# Patient Record
Sex: MALE | Race: WHITE | HISPANIC OR LATINO | Employment: FULL TIME
[De-identification: names, ages, dates, MRNs, and addresses within clinical notes are randomized per-mention and may not be internally consistent; named-entity substitution may affect disease eponyms.]

---

## 2017-01-09 ENCOUNTER — RECORDS - HEALTHEAST (OUTPATIENT)
Dept: ADMINISTRATIVE | Facility: OTHER | Age: 27
End: 2017-01-09

## 2017-03-14 ENCOUNTER — RECORDS - HEALTHEAST (OUTPATIENT)
Dept: ADMINISTRATIVE | Facility: OTHER | Age: 27
End: 2017-03-14

## 2017-03-17 ENCOUNTER — RECORDS - HEALTHEAST (OUTPATIENT)
Dept: ADMINISTRATIVE | Facility: OTHER | Age: 27
End: 2017-03-17

## 2017-03-28 ENCOUNTER — RECORDS - HEALTHEAST (OUTPATIENT)
Dept: ADMINISTRATIVE | Facility: OTHER | Age: 27
End: 2017-03-28

## 2017-05-16 ENCOUNTER — RECORDS - HEALTHEAST (OUTPATIENT)
Dept: ADMINISTRATIVE | Facility: OTHER | Age: 27
End: 2017-05-16

## 2017-07-27 ENCOUNTER — RECORDS - HEALTHEAST (OUTPATIENT)
Dept: ADMINISTRATIVE | Facility: OTHER | Age: 27
End: 2017-07-27

## 2017-08-03 ENCOUNTER — RECORDS - HEALTHEAST (OUTPATIENT)
Dept: ADMINISTRATIVE | Facility: OTHER | Age: 27
End: 2017-08-03

## 2017-12-18 ENCOUNTER — OFFICE VISIT - HEALTHEAST (OUTPATIENT)
Dept: FAMILY MEDICINE | Facility: CLINIC | Age: 27
End: 2017-12-18

## 2017-12-18 DIAGNOSIS — I10 ESSENTIAL HYPERTENSION: ICD-10-CM

## 2017-12-18 DIAGNOSIS — F90.9 ADHD: ICD-10-CM

## 2017-12-18 DIAGNOSIS — F31.81 BIPOLAR 2 DISORDER (H): ICD-10-CM

## 2017-12-18 DIAGNOSIS — M54.2 CERVICALGIA: ICD-10-CM

## 2017-12-18 ASSESSMENT — MIFFLIN-ST. JEOR: SCORE: 1774.11

## 2018-01-09 ENCOUNTER — RECORDS - HEALTHEAST (OUTPATIENT)
Dept: ADMINISTRATIVE | Facility: OTHER | Age: 28
End: 2018-01-09

## 2018-02-21 ENCOUNTER — RECORDS - HEALTHEAST (OUTPATIENT)
Dept: ADMINISTRATIVE | Facility: OTHER | Age: 28
End: 2018-02-21

## 2018-04-04 ENCOUNTER — RECORDS - HEALTHEAST (OUTPATIENT)
Dept: ADMINISTRATIVE | Facility: OTHER | Age: 28
End: 2018-04-04

## 2018-04-25 ENCOUNTER — RECORDS - HEALTHEAST (OUTPATIENT)
Dept: ADMINISTRATIVE | Facility: OTHER | Age: 28
End: 2018-04-25

## 2018-05-02 ENCOUNTER — RECORDS - HEALTHEAST (OUTPATIENT)
Dept: ADMINISTRATIVE | Facility: OTHER | Age: 28
End: 2018-05-02

## 2018-05-16 ENCOUNTER — RECORDS - HEALTHEAST (OUTPATIENT)
Dept: ADMINISTRATIVE | Facility: OTHER | Age: 28
End: 2018-05-16

## 2018-05-21 ENCOUNTER — RECORDS - HEALTHEAST (OUTPATIENT)
Dept: ADMINISTRATIVE | Facility: OTHER | Age: 28
End: 2018-05-21

## 2018-06-20 ENCOUNTER — RECORDS - HEALTHEAST (OUTPATIENT)
Dept: ADMINISTRATIVE | Facility: OTHER | Age: 28
End: 2018-06-20

## 2018-07-23 ENCOUNTER — RECORDS - HEALTHEAST (OUTPATIENT)
Dept: ADMINISTRATIVE | Facility: OTHER | Age: 28
End: 2018-07-23

## 2018-07-31 ENCOUNTER — RECORDS - HEALTHEAST (OUTPATIENT)
Dept: ADMINISTRATIVE | Facility: OTHER | Age: 28
End: 2018-07-31

## 2021-05-25 ENCOUNTER — OFFICE VISIT - HEALTHEAST (OUTPATIENT)
Dept: FAMILY MEDICINE | Facility: CLINIC | Age: 31
End: 2021-05-25

## 2021-05-25 DIAGNOSIS — Z76.89 ENCOUNTER TO ESTABLISH CARE: ICD-10-CM

## 2021-05-25 DIAGNOSIS — F90.9 ATTENTION DEFICIT HYPERACTIVITY DISORDER (ADHD), UNSPECIFIED ADHD TYPE: ICD-10-CM

## 2021-05-25 DIAGNOSIS — J30.81 ALLERGIC RHINITIS DUE TO ANIMALS: ICD-10-CM

## 2021-05-25 DIAGNOSIS — Z00.00 ANNUAL PHYSICAL EXAM: ICD-10-CM

## 2021-05-25 DIAGNOSIS — B07.0 PLANTAR WARTS: ICD-10-CM

## 2021-05-25 DIAGNOSIS — F31.81 BIPOLAR 2 DISORDER (H): ICD-10-CM

## 2021-05-25 LAB
ALBUMIN SERPL-MCNC: 4.3 G/DL (ref 3.5–5)
ALP SERPL-CCNC: 59 U/L (ref 45–120)
ALT SERPL W P-5'-P-CCNC: 14 U/L (ref 0–45)
ANION GAP SERPL CALCULATED.3IONS-SCNC: 8 MMOL/L (ref 5–18)
AST SERPL W P-5'-P-CCNC: 16 U/L (ref 0–40)
BILIRUB SERPL-MCNC: 0.6 MG/DL (ref 0–1)
BUN SERPL-MCNC: 8 MG/DL (ref 8–22)
CALCIUM SERPL-MCNC: 9.1 MG/DL (ref 8.5–10.5)
CHLORIDE BLD-SCNC: 105 MMOL/L (ref 98–107)
CHOLEST SERPL-MCNC: 157 MG/DL
CO2 SERPL-SCNC: 26 MMOL/L (ref 22–31)
CREAT SERPL-MCNC: 0.85 MG/DL (ref 0.7–1.3)
ERYTHROCYTE [DISTWIDTH] IN BLOOD BY AUTOMATED COUNT: 12.1 % (ref 11–14.5)
FASTING STATUS PATIENT QL REPORTED: YES
GFR SERPL CREATININE-BSD FRML MDRD: >60 ML/MIN/1.73M2
GLUCOSE BLD-MCNC: 85 MG/DL (ref 70–125)
HCT VFR BLD AUTO: 45 % (ref 40–54)
HDLC SERPL-MCNC: 37 MG/DL
HGB BLD-MCNC: 15.5 G/DL (ref 14–18)
LDLC SERPL CALC-MCNC: 92 MG/DL
MCH RBC QN AUTO: 29.3 PG (ref 27–34)
MCHC RBC AUTO-ENTMCNC: 34.4 G/DL (ref 32–36)
MCV RBC AUTO: 85 FL (ref 80–100)
PLATELET # BLD AUTO: 201 THOU/UL (ref 140–440)
PMV BLD AUTO: 10.2 FL (ref 7–10)
POTASSIUM BLD-SCNC: 4.5 MMOL/L (ref 3.5–5)
PROT SERPL-MCNC: 6.8 G/DL (ref 6–8)
RBC # BLD AUTO: 5.29 MILL/UL (ref 4.4–6.2)
SODIUM SERPL-SCNC: 139 MMOL/L (ref 136–145)
TRIGL SERPL-MCNC: 142 MG/DL
WBC: 5.3 THOU/UL (ref 4–11)

## 2021-05-25 ASSESSMENT — MIFFLIN-ST. JEOR: SCORE: 1787.26

## 2021-05-25 ASSESSMENT — PATIENT HEALTH QUESTIONNAIRE - PHQ9: SUM OF ALL RESPONSES TO PHQ QUESTIONS 1-9: 16

## 2021-05-26 ENCOUNTER — COMMUNICATION - HEALTHEAST (OUTPATIENT)
Dept: FAMILY MEDICINE | Facility: CLINIC | Age: 31
End: 2021-05-26

## 2021-05-31 VITALS — WEIGHT: 178.1 LBS | BODY MASS INDEX: 25.5 KG/M2 | HEIGHT: 70 IN

## 2021-06-01 ENCOUNTER — AMBULATORY - HEALTHEAST (OUTPATIENT)
Dept: FAMILY MEDICINE | Facility: CLINIC | Age: 31
End: 2021-06-01

## 2021-06-01 DIAGNOSIS — B07.8 COMMON WART: ICD-10-CM

## 2021-06-01 DIAGNOSIS — B07.0 PLANTAR WART: ICD-10-CM

## 2021-06-14 NOTE — PROGRESS NOTES
Assessment/Plan:     1. Cervicalgia  I believe specialty care is appropriate for this patient.  Referral placed for spine care to continue at [facility].  - Ambulatory referral to Spine Care    2. Bipolar 2 disorder  Continue following a psychiatry for ADHD and bipolar disorder.  - Ambulatory referral to Psychiatry    3. Essential hypertension  Blood pressure elevated today.  Likely secondary to prescription amphetamine use.  However, it appears that this is been a problem for some time.  Discussed benefits of treating, and will initiate today.  Start lisinopril, 10 mg once daily.  Discussed proper use and possible side effects of this medication.  Patient instructed to follow-up with me in 3-4 weeks.  We will check kidney function at that visit.  - lisinopril (PRINIVIL,ZESTRIL) 10 MG tablet; Take 1 tablet (10 mg total) by mouth daily.  Dispense: 30 tablet; Refill: 1    Subjective:     Jorge A Garcia is a 27 y.o. male who presents to establish care and for referrals.  Pertinent history of chronic neck and bilateral hand radiculopathy.  Patient follows with [facility] for this and has had 2 epidural injections.  Patient plays the guitar and piano, and believes his symptoms stem from this.  He is also undergoing physical therapy at [facility].  Patient's insurance has changed, and his new insurance is requesting a referral for further treatment at [facility].    Patient also has history of ADHD and bipolar disorder.  He follows with Dr. Woodruff at [facility].  He is currently utilizing Adderall and Lamictal, which has been a good regimen for him.  Patient has been told that his blood pressure has been high in the past.  He does not, however check this frequently.  No chest pain, shortness of breath, or lower extremity edema.  No pertinent family history of hypertension or heart disease.      The following portions of the patient's history were reviewed and updated as appropriate: allergies,  "current medications, past family history, past medical history, past social history, past surgical history and problem list.    Review of Systems  Pertinent items are noted in HPI.     Objective:     /88 (Patient Site: Right Arm)  Pulse 99  Ht 5' 10\" (1.778 m)  Wt 178 lb 1.6 oz (80.8 kg)  SpO2 98%  BMI 25.55 kg/m2    General Appearance: Alert, cooperative, no distress, appears stated age  Lungs: Clear to auscultation bilaterally, respirations unlabored  Heart: Regular rate and rhythm, S1 and S2 normal, no murmur, rub, or gallop. No LE edema.  Psychologic: appropriate affective, answers all of my questions appropriately. No hallucinations, delusion, or suicidal ideations.    Deann Harrison NP-C    "

## 2021-06-16 PROBLEM — F90.9 ADHD: Status: ACTIVE | Noted: 2017-12-18

## 2021-06-17 NOTE — PROGRESS NOTES
"Assessment/ Plan     31-year-old male with past medical history of ADHD, bipolar type II, allergic rhinitis, depression who presents for physical exam and to establish care.  Patient also wishes to discuss some mild concerns today including treatment options for plantar warts on his right foot as well as a referral to an allergist to consider allergy shots.  Plantar warts believe were under treated with over-the-counter cryotherapy.  I recommended a full course of cryotherapy here after paring down plantar warts given significant callus buildup.  Patient will make a follow-up appointment to do this.  Did give referral to allergist to consider injections.  Given that patient is new to me he did review his previous diagnosis and updated in problem list as below.  For physical he is only due for basic blood work as below.  He believes he has been tested for HIV as well as hep C and is up-to-date on his immunizations despite what our chart says.  He has had care at various other places during his life due to schooling.  He will attempt obtain these records so we can consolidate them and get him up-to-date if needed.    1. Attention deficit hyperactivity disorder (ADHD), unspecified ADHD type  Dr. Hernández-psychiatrist of [facility]. Diagnosed in College now on stimulant working well.  In college he states things got \"rough\".  He started having significant anxiety and depression. Fair grades during this. No improvement in grades after stimulant. He noticed improvement in \"anxiety, fidgetiness, restlessness\". He was formally tested and diagnosed with this.     2. Bipolar 2 disorder (H)  Diagnosed in college. 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 He was on lamotrigine but had hives. He is considering starting lithium. His thyroid was checked and \"boderline low\".  Trying to correct this before starting lithium. ON synthroid like replacement.     3. Allergic rhinitis due to animals  - Ambulatory referral to Allergy - " [facility]    4. Annual physical exam  - Comprehensive Metabolic Panel  - Lipid [place] FASTING  - HM2(CBC w/o Differential)    5. Encounter to establish care    6. Plantar warts    Follow-up: in 1-2 weeks to start wart cryotherapy    Mckinley Ellison MD    Subjective:     Jorge A Garcia is a 31 y.o. male who presents for an annual exam.      Chief Complaint   Patient presents with     Annual Exam     fasting     Wart on foot     Allergies     referral to allergist, increased reaction to dogs, and seasonal allergies     Warts:  Patient has two warts on his feet.  Right foot, has been there for several years.  He has tried over-the-counter cryotherapy treatment but this does not seem to have helped.  Bothersome when he walks as he feels a pressure at the location of his warts.  Wondering about treatment options for this.    Allergies:  Patient has significant allergies to animals as well as some pollens.  He has been controlling this with over-the-counter antihistamine as well as Nasonex.  He is considering allergy shots.  Would like more information regarding these.  He has never had skin testing to determine his exact allergies.  Feels his allergies have been consistently worsening over the last couple years.    Immunization History   Administered Date(s) Administered     Influenza, inj, historic,unspecified 01/11/2013     Influenza,seasonal quad, PF 01/10/2014     Meningococcal MCV4P 06/17/2009     Tdap 06/17/2009     Immunization status: up to date, do not have records from previous but he will try to obtain these    Current Outpatient Medications   Medication Sig Dispense Refill     cetirizine (ZYRTEC) 10 MG tablet Take 10 mg by mouth.       dextroamphetamine-amphetamine 20 mg Tab TK 1 T PO BID  0     liothyronine (CYTOMEL) 5 MCG tablet Take 5 mcg by mouth daily.       mometasone (NASONEX) 50 mcg/actuation nasal spray 2 sprays into each nostril.       lamoTRIgine (LAMICTAL) 100 MG tablet   2     lisinopril  (PRINIVIL,ZESTRIL) 10 MG tablet Take 1 tablet (10 mg total) by mouth daily. 30 tablet 1     meloxicam (MOBIC) 15 MG tablet TK 1 T PO QD  1     No current facility-administered medications for this visit.      No past medical history on file.  Past Surgical History:   Procedure Laterality Date     WISDOM TOOTH EXTRACTION       Cephalosporins and Penicillins  No family history on file.  Social History     Socioeconomic History     Marital status: Single     Spouse name: Not on file     Number of children: Not on file     Years of education: Not on file     Highest education level: Not on file   Occupational History     Not on file   Social Needs     Financial resource strain: Not on file     Food insecurity     Worry: Not on file     Inability: Not on file     Transportation needs     Medical: Not on file     Non-medical: Not on file   Tobacco Use     Smoking status: Never Smoker     Smokeless tobacco: Never Used   Substance and Sexual Activity     Alcohol use: No     Drug use: No     Sexual activity: Not on file   Lifestyle     Physical activity     Days per week: Not on file     Minutes per session: Not on file     Stress: Not on file   Relationships     Social connections     Talks on phone: Not on file     Gets together: Not on file     Attends Restorationist service: Not on file     Active member of club or organization: Not on file     Attends meetings of clubs or organizations: Not on file     Relationship status: Not on file     Intimate partner violence     Fear of current or ex partner: Not on file     Emotionally abused: Not on file     Physically abused: Not on file     Forced sexual activity: Not on file   Other Topics Concern     Not on file   Social History Narrative     Not on file       Review of Systems  Complete ROS negative except as noted in the HPI    Objective:      Vitals:    05/25/21 0806   BP: 132/80   Pulse: 70   Resp: 20   Temp: 98  F (36.7  C)   SpO2: 98%   Weight: 181 lb (82.1 kg)   Height: 5'  "10\" (1.778 m)       Physical Exam:  /80   Pulse 70   Temp 98  F (36.7  C)   Resp 20   Ht 5' 10\" (1.778 m)   Wt 181 lb (82.1 kg)   SpO2 98%   BMI 25.97 kg/m      General appearance: Alert, cooperative, no distress, appears stated age  Head: Normocephalic, atraumatic, without obvious abnormality  EARS: TM's gray dull with structures seen bilaterally  Eyes: Pupils equal round, reactive.  Conjunctiva clear.  Nose: Nares normal, no drainage.  Throat: Lips, mucosa, tongue normal mucosa pink and moist  Neck: Supple, symmetric, trachea midline, no adenopathy.  No thyroid enlargement, tenderness or nodules.    Lungs: Clear to auscultation bilaterally, no wheezing or crackles present.  Respirations unlabored  Heart: Regular rate and rhythm, normal S1 and S2, no murmur, rub or gallop.  Abdomen: Soft, nontender, nondistended.  Bowel sounds active in all 4 quadrants.  No masses or organomegaly.  Extremities: Extremities normal, atraumatic.  No cyanosis or edema.  Skin: two plantar warts over heel of right foot, significant callus build up.   Neurologic: CN II through XII intact, normal strength.    No results found for this or any previous visit.    Mckinley Alcazar MD    "

## 2021-06-25 NOTE — PROGRESS NOTES
Assessment and Plan     1. Plantar wart  2. Common wart  Procedure: Cryotherapy  Plantar warts on right foot were paired to base using a 10 scalpel.  Using cryotherapy applicator area was frozen with 1 mm border surrounding and allowed to thaw completely. Two warts over right plantar foot and one wart on left pointer finger.   Freezing was then repeated.  Patient tolerated the procedure well without complications.  Normal post cryotherapy side effects and return precautions given.  Recommended repeat cryotherapy in 2 to 3 weeks if wart persists.      Follow up: 2-3 weeks  Options for treatment and follow-up care were reviewed with the patient and/or guardian. Jorge A Garcia and/or guardian engaged in the decision making process and verbalized understanding of the options discussed and agreed with the final plan.    Dr. Mckinley Ellison MD         HPI:   Jorge A Garcia is a 31 y.o.  male with problems as below, who presents for:    Chief Complaint   Patient presents with     Concerns     wart removal on right foot. questioning about one of finger as well.      31-year-old male who presents for wart removal on his right foot and left hand.         PMHX:     Patient Active Problem List   Diagnosis     Major Depression, Recurrent     Bipolar 2 disorder (H)     Anxiety Disorder NOS     ADHD       Current Outpatient Medications   Medication Sig Dispense Refill     cetirizine (ZYRTEC) 10 MG tablet Take 10 mg by mouth.       dextroamphetamine-amphetamine 20 mg Tab TK 1 T PO BID  0     liothyronine (CYTOMEL) 5 MCG tablet Take 5 mcg by mouth daily.       mometasone (NASONEX) 50 mcg/actuation nasal spray 2 sprays into each nostril.       No current facility-administered medications for this visit.        Social History     Tobacco Use     Smoking status: Never Smoker     Smokeless tobacco: Never Used   Substance Use Topics     Alcohol use: Yes     Frequency: 2-3 times a week     Drinks per session: 1 or 2     Drug use: No        Social History     Social History Narrative     Not on file       Allergies   Allergen Reactions     Cephalosporins      Penicillins               Review of Systems:    Complete ROS is negative except as noted in the HPI         Physical Exam:   /74 (Patient Site: Right Arm, Patient Position: Sitting, Cuff Size: Adult Large)   Pulse 90   Wt 184 lb 8 oz (83.7 kg)   SpO2 96%   BMI 26.47 kg/m      General appearance: Alert, cooperative, no distress, appears stated age  Head: Normocephalic, atraumatic, without obvious abnormality  Eyes: Pupils equal round, reactive.  Conjunctiva clear.  Neck: Supple, symmetric, trachea midline  Lungs: Clear to auscultation bilaterally, no wheezing or crackles present.  Respirations unlabored  Heart: Regular rate and rhythm, normal S1 and S2, no murmur, rub or gallop.  Skin: 2 plantar warts on right foot approximately 0.25 cm in size over heel. Some associated corn buildup.  Approximately 0.25 cm wart over left hand point at dorsal DIP

## 2021-07-04 NOTE — LETTER
Letter by Mckinley Alcazar MD at      Author: Mckinley Alcazar MD Service: -- Author Type: --    Filed:  Encounter Date: 5/26/2021 Status: (Other)         Jorge A Garcia  [address] [address]             May 26, 2021         Dear Mr. Garcia,    Below are the results from your recent visit:    Resulted Orders   Comprehensive Metabolic Panel   Result Value Ref Range    Sodium 139 136 - 145 mmol/L    Potassium 4.5 3.5 - 5.0 mmol/L    Chloride 105 98 - 107 mmol/L    CO2 26 22 - 31 mmol/L    Anion Gap, Calculation 8 5 - 18 mmol/L    Glucose 85 70 - 125 mg/dL    BUN 8 8 - 22 mg/dL    Creatinine 0.85 0.70 - 1.30 mg/dL    GFR MDRD Af Amer >60 >60 mL/min/1.73m2    GFR MDRD Non Af Amer >60 >60 mL/min/1.73m2    Bilirubin, Total 0.6 0.0 - 1.0 mg/dL    Calcium 9.1 8.5 - 10.5 mg/dL    Protein, Total 6.8 6.0 - 8.0 g/dL    Albumin 4.3 3.5 - 5.0 g/dL    Alkaline Phosphatase 59 45 - 120 U/L    AST 16 0 - 40 U/L    ALT 14 0 - 45 U/L    Narrative    Fasting Glucose reference range is 70-99 mg/dL per  American Diabetes Association (ADA) guidelines.   Lipid [place] FASTING   Result Value Ref Range    Cholesterol 157 <=199 mg/dL    Triglycerides 142 <=149 mg/dL    HDL Cholesterol 37 (L) >=40 mg/dL    LDL Calculated 92 <=129 mg/dL    Patient Fasting > 8hrs? Yes    HM2(CBC w/o Differential)   Result Value Ref Range    WBC 5.3 4.0 - 11.0 thou/uL    RBC 5.29 4.40 - 6.20 mill/uL    Hemoglobin 15.5 14.0 - 18.0 g/dL    Hematocrit 45.0 40.0 - 54.0 %    MCV 85 80 - 100 fL    MCH 29.3 27.0 - 34.0 pg    MCHC 34.4 32.0 - 36.0 g/dL    RDW 12.1 11.0 - 14.5 %    Platelets 201 140 - 440 thou/uL    MPV 10.2 (H) 7.0 - 10.0 fL       Zia,  Your results from your recent clinic visit show:  Your CMP was normal with normal electrolytes, kidney function, and liver function  Your cholesterol was normal  Your CBC is normal with no anemia or signs of infection seen    Please call with questions or contact us using  [facility].    Sincerely,        Electronically signed by Mckinley Alcazar MD

## 2021-07-06 VITALS
HEART RATE: 90 BPM | SYSTOLIC BLOOD PRESSURE: 112 MMHG | OXYGEN SATURATION: 96 % | DIASTOLIC BLOOD PRESSURE: 74 MMHG | BODY MASS INDEX: 26.47 KG/M2 | WEIGHT: 184.5 LBS

## 2021-07-06 VITALS
WEIGHT: 181 LBS | SYSTOLIC BLOOD PRESSURE: 132 MMHG | RESPIRATION RATE: 20 BRPM | HEIGHT: 70 IN | DIASTOLIC BLOOD PRESSURE: 80 MMHG | TEMPERATURE: 98 F | OXYGEN SATURATION: 98 % | BODY MASS INDEX: 25.91 KG/M2 | HEART RATE: 70 BPM

## 2021-07-06 ASSESSMENT — PATIENT HEALTH QUESTIONNAIRE - PHQ9: SUM OF ALL RESPONSES TO PHQ QUESTIONS 1-9: 16

## 2021-07-16 ENCOUNTER — OFFICE VISIT (OUTPATIENT)
Dept: ALLERGY | Facility: CLINIC | Age: 31
End: 2021-07-16
Payer: COMMERCIAL

## 2021-07-16 VITALS — WEIGHT: 184 LBS | OXYGEN SATURATION: 98 % | BODY MASS INDEX: 26.34 KG/M2 | HEIGHT: 70 IN | HEART RATE: 87 BPM

## 2021-07-16 DIAGNOSIS — J30.81 ALLERGIC RHINITIS DUE TO ANIMALS: ICD-10-CM

## 2021-07-16 DIAGNOSIS — J30.1 SEASONAL ALLERGIC RHINITIS DUE TO POLLEN: ICD-10-CM

## 2021-07-16 DIAGNOSIS — J45.990 EXERCISE INDUCED BRONCHOSPASM: Primary | ICD-10-CM

## 2021-07-16 PROCEDURE — 99244 OFF/OP CNSLTJ NEW/EST MOD 40: CPT | Mod: 25 | Performed by: ALLERGY & IMMUNOLOGY

## 2021-07-16 PROCEDURE — 95004 PERQ TESTS W/ALRGNC XTRCS: CPT | Performed by: ALLERGY & IMMUNOLOGY

## 2021-07-16 RX ORDER — LIOTHYRONINE SODIUM 5 UG/1
5 TABLET ORAL DAILY
COMMUNITY
Start: 2021-07-05

## 2021-07-16 RX ORDER — DEXTROAMPHETAMINE SACCHARATE, AMPHETAMINE ASPARTATE, DEXTROAMPHETAMINE SULFATE AND AMPHETAMINE SULFATE 5; 5; 5; 5 MG/1; MG/1; MG/1; MG/1
TABLET ORAL 2 TIMES DAILY
COMMUNITY
Start: 2021-02-23

## 2021-07-16 RX ORDER — ALBUTEROL SULFATE 90 UG/1
2 AEROSOL, METERED RESPIRATORY (INHALATION) EVERY 6 HOURS
Qty: 16 G | Refills: 1 | Status: SHIPPED | OUTPATIENT
Start: 2021-07-16 | End: 2022-06-21

## 2021-07-16 ASSESSMENT — MIFFLIN-ST. JEOR: SCORE: 1800.87

## 2021-07-16 NOTE — PROGRESS NOTES
"      Subjective       HPI chief complaint: Allergies    History of present illness: This is a pleasant 31-year-old gentleman that was asked to see for evaluation by Dr. Alcazar here today for evaluation of allergies.  Patient states he had allergies since he was a kid.  He is never been tested.  He states symptoms consist of itchy, watery eyes, sneezing.  He states that last month he tried to move into an apartment that may have had mold or animals and he had lots of nasal congestion and drainage including do this.  He states that he has noted that his allergy symptoms seem to worsen with each year and is interested in allergy shots.  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  Is never been formally diagnosed with asthma.  No cough, wheeze or shortness of breath at rest.  This happens typically during the pollen seasons.  He does use Zyrtec and Flonase for his allergy symptoms year-round.  This does help but does not completely resolve symptoms.    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      Objective    Pulse 87   Ht 1.778 m (5' 10\")   Wt 83.5 kg (184 lb)   SpO2 98%   BMI 26.40 kg/m    Body mass index is 26.4 kg/m .  Physical Exam      Gen: Pleasant male not in acute distress  HEENT: Eyes no erythema of the bulbar or palpebral conjunctiva, no edema. Ears: TMs well visualized, no effusions. Nose: No congestion, mucosa normal. Mouth: Throat clear, no lip or tongue edema.   Cardiac: Regular rate and rhythm, no murmurs, rubs or gallops  Respiratory: Clear to auscultation bilaterally, no adventitious breath " Telephone Encounter by Sweetie William RN at 11/03/17 04:49 PM     Author:  Sweetie William RN Service:  (none) Author Type:  Registered Nurse     Filed:  11/03/17 04:52 PM Encounter Date:  11/3/2017 Status:  Signed     :  Sweetie William RN (Registered Nurse)            Patient States, I was at the dentist yesterday and today I have a red, itchy rash on my face.\"  \"It is only in a small area, but I don't want it to spread.\"  Patient indicates she does not know what caused it.  Patient indicates that Dr. Quesada placed her on Bactroban for similar rash and she would like a script for it.  Would like it sent to [facility].  Patient would like call back.    To Dr. Quesada  Ok for script.  Thank you.[VP1.1M]      Revision History        User Key Date/Time User Provider Type Action    > VP1.1 11/03/17 04:52 PM Sweetie William RN Registered Nurse Sign    M - Manual             sounds  Lymph: No visible supraclavicular or cervical lymphadenopathy  Skin: No rashes or lesions  Psych: Alert and oriented times 3    30 percutaneous test were placed to the environmental skin test panel.  Positive histamine control.  Positive test to trees, grass, ragweed, weed pollen, cat and dog.    Impression report and plan:    1.  Allergic rhinoconjunctivitis  2.  Exercise-induced bronchospasm    Prescribed albuterol inhaler to use 2 puffs 15 to 30 minutes prior to exercise.  Notify if using outside exercise.  Continue with Flonase and an antihistamine.  Not a candidate for montelukast.  Could consider Astelin nasal spray.  Reviewed environmental control.  I went over the risks and benefits of allergy shots.  I stated risks include hives, swelling, shortness of breath.  I did state that one in 2.5 million shot administrations can result in death.  I stated they must wait in the office for 30 minutes following the shot and carry an epinephrine device on the day of the shot.  I stated that shots are effective in about 90% of patients.  I stated that they should check with the insurance company prior to proceeding.  They understand the risks and benefits and will let me know if you like to proceed.

## 2021-07-16 NOTE — LETTER
"    7/16/2021         RE: Jorge A Garcia  [address] [address]        Dear Colleague,    Thank you for referring your patient, Jorge A Garcia, to the [facility]. I have suggested allergy shots. Please see a copy of my visit note below.          Subjective       HPI chief complaint: Allergies    History of present illness: This is a pleasant 31-year-old gentleman that was asked to see for evaluation by Dr. Alcazar here today for evaluation of allergies.  Patient states he had allergies since he was a kid.  He is never been tested.  He states symptoms consist of itchy, watery eyes, sneezing.  He states that last month he tried to move into an apartment that may have had mold or animals and he had lots of nasal congestion and drainage including do this.  He states that he has noted that his allergy symptoms seem to worsen with each year and is interested in allergy shots.  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  Is never been formally diagnosed with asthma.  No cough, wheeze or shortness of breath at rest.  This happens typically during the pollen seasons.  He does use Zyrtec and Flonase for his allergy symptoms year-round.  This does help but does not completely resolve symptoms.    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      Objective    Pulse 87   Ht 1.778 m (5' 10\")   Wt 83.5 kg (184 lb)   SpO2 98%   BMI 26.40 kg/m    Body mass index is 26.4 kg/m .  Physical Exam      Gen: Pleasant male not in acute distress  HEENT: Eyes no erythema of the bulbar or palpebral " conjunctiva, no edema. Ears: TMs well visualized, no effusions. Nose: No congestion, mucosa normal. Mouth: Throat clear, no lip or tongue edema.   Cardiac: Regular rate and rhythm, no murmurs, rubs or gallops  Respiratory: Clear to auscultation bilaterally, no adventitious breath sounds  Lymph: No visible supraclavicular or cervical lymphadenopathy  Skin: No rashes or lesions  Psych: Alert and oriented times 3    30 percutaneous test were placed to the environmental skin test panel.  Positive histamine control.  Positive test to trees, grass, ragweed, weed pollen, cat and dog.    Impression report and plan:    1.  Allergic rhinoconjunctivitis  2.  Exercise-induced bronchospasm    Prescribed albuterol inhaler to use 2 puffs 15 to 30 minutes prior to exercise.  Notify if using outside exercise.  Continue with Flonase and an antihistamine.  Not a candidate for montelukast.  Could consider Astelin nasal spray.  Reviewed environmental control.  I went over the risks and benefits of allergy shots.  I stated risks include hives, swelling, shortness of breath.  I did state that one in 2.5 million shot administrations can result in death.  I stated they must wait in the office for 30 minutes following the shot and carry an epinephrine device on the day of the shot.  I stated that shots are effective in about 90% of patients.  I stated that they should check with the insurance company prior to proceeding.  They understand the risks and benefits and will let me know if you like to proceed.        Again, thank you for allowing me to participate in the care of your patient.        Sincerely,        Arabella GRAVES MD

## 2021-07-23 ENCOUNTER — TELEPHONE (OUTPATIENT)
Dept: ALLERGY | Facility: CLINIC | Age: 31
End: 2021-07-23

## 2021-07-23 NOTE — TELEPHONE ENCOUNTER
Dr. Paige  This person called and is requesting a call back:    Name Of Person Who Called:   Jorge A   Why Did The Person Call: wants to begin allergy shots, there is no direction in chart that I can see     Best Phone Number To Call Back: [phone]  Okay To Leave A Detailed Voicemail? yes    Thank you.

## 2021-08-12 ENCOUNTER — TRANSFERRED RECORDS (OUTPATIENT)
Dept: HEALTH INFORMATION MANAGEMENT | Facility: CLINIC | Age: 31
End: 2021-08-12

## 2021-08-16 DIAGNOSIS — J30.1 SEASONAL ALLERGIC RHINITIS DUE TO POLLEN: ICD-10-CM

## 2021-08-16 DIAGNOSIS — J30.81 ALLERGIC RHINITIS DUE TO CATS: ICD-10-CM

## 2021-08-16 DIAGNOSIS — J30.81 ALLERGY TO DOG DANDER: Primary | ICD-10-CM

## 2021-08-16 PROCEDURE — 95165 ANTIGEN THERAPY SERVICES: CPT | Performed by: ALLERGY & IMMUNOLOGY

## 2021-08-24 ENCOUNTER — ALLIED HEALTH/NURSE VISIT (OUTPATIENT)
Dept: ALLERGY | Facility: CLINIC | Age: 31
End: 2021-08-24
Payer: COMMERCIAL

## 2021-08-24 DIAGNOSIS — J30.81 ALLERGIC RHINITIS DUE TO CATS: ICD-10-CM

## 2021-08-24 DIAGNOSIS — J30.81 ALLERGY TO DOG DANDER: Primary | ICD-10-CM

## 2021-08-24 DIAGNOSIS — J30.1 SEASONAL ALLERGIC RHINITIS DUE TO POLLEN: ICD-10-CM

## 2021-08-24 PROCEDURE — 95117 IMMUNOTHERAPY INJECTIONS: CPT

## 2021-08-31 ENCOUNTER — ALLIED HEALTH/NURSE VISIT (OUTPATIENT)
Dept: ALLERGY | Facility: CLINIC | Age: 31
End: 2021-08-31
Payer: COMMERCIAL

## 2021-08-31 DIAGNOSIS — J30.81 ALLERGIC RHINITIS DUE TO CATS: ICD-10-CM

## 2021-08-31 DIAGNOSIS — J30.1 SEASONAL ALLERGIC RHINITIS DUE TO POLLEN: ICD-10-CM

## 2021-08-31 DIAGNOSIS — J30.81 ALLERGY TO DOG DANDER: Primary | ICD-10-CM

## 2021-08-31 PROCEDURE — 95117 IMMUNOTHERAPY INJECTIONS: CPT

## 2021-09-07 ENCOUNTER — ALLIED HEALTH/NURSE VISIT (OUTPATIENT)
Dept: ALLERGY | Facility: CLINIC | Age: 31
End: 2021-09-07
Payer: COMMERCIAL

## 2021-09-07 DIAGNOSIS — J30.1 SEASONAL ALLERGIC RHINITIS DUE TO POLLEN: ICD-10-CM

## 2021-09-07 DIAGNOSIS — J30.81 ALLERGIC RHINITIS DUE TO CATS: ICD-10-CM

## 2021-09-07 DIAGNOSIS — J30.81 ALLERGY TO DOG DANDER: Primary | ICD-10-CM

## 2021-09-07 PROCEDURE — 95117 IMMUNOTHERAPY INJECTIONS: CPT

## 2021-09-14 ENCOUNTER — ALLIED HEALTH/NURSE VISIT (OUTPATIENT)
Dept: ALLERGY | Facility: CLINIC | Age: 31
End: 2021-09-14
Payer: COMMERCIAL

## 2021-09-14 DIAGNOSIS — J30.81 ALLERGIC RHINITIS DUE TO CATS: ICD-10-CM

## 2021-09-14 DIAGNOSIS — J30.81 ALLERGY TO DOG DANDER: Primary | ICD-10-CM

## 2021-09-14 DIAGNOSIS — J30.1 SEASONAL ALLERGIC RHINITIS DUE TO POLLEN: ICD-10-CM

## 2021-09-14 PROCEDURE — 95117 IMMUNOTHERAPY INJECTIONS: CPT

## 2021-09-17 ENCOUNTER — ALLIED HEALTH/NURSE VISIT (OUTPATIENT)
Dept: ALLERGY | Facility: CLINIC | Age: 31
End: 2021-09-17
Payer: COMMERCIAL

## 2021-09-17 DIAGNOSIS — J30.81 ALLERGIC RHINITIS DUE TO CATS: ICD-10-CM

## 2021-09-17 DIAGNOSIS — J30.81 ALLERGY TO DOG DANDER: Primary | ICD-10-CM

## 2021-09-17 DIAGNOSIS — J30.1 SEASONAL ALLERGIC RHINITIS DUE TO POLLEN: ICD-10-CM

## 2021-09-17 PROCEDURE — 95117 IMMUNOTHERAPY INJECTIONS: CPT

## 2021-09-21 ENCOUNTER — ALLIED HEALTH/NURSE VISIT (OUTPATIENT)
Dept: ALLERGY | Facility: CLINIC | Age: 31
End: 2021-09-21
Payer: COMMERCIAL

## 2021-09-21 DIAGNOSIS — J30.81 ALLERGIC RHINITIS DUE TO CATS: ICD-10-CM

## 2021-09-21 DIAGNOSIS — J30.1 SEASONAL ALLERGIC RHINITIS DUE TO POLLEN: ICD-10-CM

## 2021-09-21 DIAGNOSIS — J30.81 ALLERGY TO DOG DANDER: Primary | ICD-10-CM

## 2021-09-21 PROCEDURE — 95117 IMMUNOTHERAPY INJECTIONS: CPT

## 2021-09-24 ENCOUNTER — OFFICE VISIT (OUTPATIENT)
Dept: ALLERGY | Facility: CLINIC | Age: 31
End: 2021-09-24
Payer: COMMERCIAL

## 2021-09-24 VITALS — HEART RATE: 75 BPM | OXYGEN SATURATION: 98 % | RESPIRATION RATE: 16 BRPM

## 2021-09-24 DIAGNOSIS — J30.1 SEASONAL ALLERGIC RHINITIS DUE TO POLLEN: ICD-10-CM

## 2021-09-24 DIAGNOSIS — J30.81 ALLERGIC RHINITIS DUE TO CATS: Primary | ICD-10-CM

## 2021-09-24 DIAGNOSIS — J30.81 ALLERGY TO DOG DANDER: ICD-10-CM

## 2021-09-24 PROCEDURE — 99213 OFFICE O/P EST LOW 20 MIN: CPT | Mod: 25 | Performed by: ALLERGY & IMMUNOLOGY

## 2021-09-24 PROCEDURE — 95117 IMMUNOTHERAPY INJECTIONS: CPT | Performed by: ALLERGY & IMMUNOLOGY

## 2021-09-24 ASSESSMENT — ASTHMA QUESTIONNAIRES
QUESTION_5 LAST FOUR WEEKS HOW WOULD YOU RATE YOUR ASTHMA CONTROL: WELL CONTROLLED
QUESTION_2 LAST FOUR WEEKS HOW OFTEN HAVE YOU HAD SHORTNESS OF BREATH: ONCE OR TWICE A WEEK
QUESTION_3 LAST FOUR WEEKS HOW OFTEN DID YOUR ASTHMA SYMPTOMS (WHEEZING, COUGHING, SHORTNESS OF BREATH, CHEST TIGHTNESS OR PAIN) WAKE YOU UP AT NIGHT OR EARLIER THAN USUAL IN THE MORNING: NOT AT ALL
QUESTION_4 LAST FOUR WEEKS HOW OFTEN HAVE YOU USED YOUR RESCUE INHALER OR NEBULIZER MEDICATION (SUCH AS ALBUTEROL): TWO OR THREE TIMES PER WEEK
ACT_TOTALSCORE: 21
QUESTION_1 LAST FOUR WEEKS HOW MUCH OF THE TIME DID YOUR ASTHMA KEEP YOU FROM GETTING AS MUCH DONE AT WORK, SCHOOL OR AT HOME: NONE OF THE TIME

## 2021-09-24 NOTE — PROGRESS NOTES
Subjective       HPI chief complaint: Follow-up allergies    History of present illness: This is a pleasant 31-year-old gentleman currently on allergy immunotherapy.  We need his blue vial.  No systemic reactions with some site reactions.  He has been using his albuterol inhaler prior to swimming and this seems to be helping.  He denies any cough or wheeze or shortness of breath outside of swimming.          Review of Systems         Objective    Pulse 75   Resp 16   SpO2 98%   There is no height or weight on file to calculate BMI.  Physical Exam     Gen: Pleasant male not in acute distress  HEENT: Eyes no erythema of the bulbar or palpebral conjunctiva, no edema.   Skin: No rashes or lesions  Psych: Alert and oriented times 3    Impression report and plan:  1.  Allergic rhinitis  2.  Intermittent asthma    ACT score today is 21.  Continue allergy shots.  Continue current medication therapy.  Notify of systemic reaction.  Follow in 6 weeks.

## 2021-09-24 NOTE — LETTER
9/24/2021         RE: Jorge A Garcia  [address] [address]        Dear Colleague,    Thank you for referring your patient, Jorge A Garcia, to the [facility]. Please see a copy of my visit note below.            Subjective       HPI chief complaint: Follow-up allergies    History of present illness: This is a pleasant 31-year-old gentleman currently on allergy immunotherapy.  We need his blue vial.  No systemic reactions with some site reactions.  He has been using his albuterol inhaler prior to swimming and this seems to be helping.  He denies any cough or wheeze or shortness of breath outside of swimming.          Review of Systems         Objective    Pulse 75   Resp 16   SpO2 98%   There is no height or weight on file to calculate BMI.  Physical Exam     Gen: Pleasant male not in acute distress  HEENT: Eyes no erythema of the bulbar or palpebral conjunctiva, no edema.   Skin: No rashes or lesions  Psych: Alert and oriented times 3    Impression report and plan:  1.  Allergic rhinitis  2.  Intermittent asthma    ACT score today is 21.  Continue allergy shots.  Continue current medication therapy.  Notify of systemic reaction.  Follow in 6 weeks.        Again, thank you for allowing me to participate in the care of your patient.        Sincerely,        Arabella GRAVES MD

## 2021-09-25 ASSESSMENT — ASTHMA QUESTIONNAIRES: ACT_TOTALSCORE: 21

## 2021-10-02 ENCOUNTER — HEALTH MAINTENANCE LETTER (OUTPATIENT)
Age: 31
End: 2021-10-02

## 2021-10-12 ENCOUNTER — ALLIED HEALTH/NURSE VISIT (OUTPATIENT)
Dept: ALLERGY | Facility: CLINIC | Age: 31
End: 2021-10-12
Payer: COMMERCIAL

## 2021-10-12 DIAGNOSIS — J30.81 ALLERGIC RHINITIS DUE TO CATS: Primary | ICD-10-CM

## 2021-10-12 DIAGNOSIS — J30.1 SEASONAL ALLERGIC RHINITIS DUE TO POLLEN: ICD-10-CM

## 2021-10-12 PROCEDURE — 95117 IMMUNOTHERAPY INJECTIONS: CPT

## 2021-10-12 RX ORDER — EPINEPHRINE 0.3 MG/.3ML
INJECTION SUBCUTANEOUS
Qty: 2 EACH | Refills: 1 | Status: SHIPPED | OUTPATIENT
Start: 2021-10-12

## 2021-10-19 ENCOUNTER — ALLIED HEALTH/NURSE VISIT (OUTPATIENT)
Dept: ALLERGY | Facility: CLINIC | Age: 31
End: 2021-10-19
Payer: COMMERCIAL

## 2021-10-19 DIAGNOSIS — J30.81 ALLERGIC RHINITIS DUE TO CATS: Primary | ICD-10-CM

## 2021-10-19 DIAGNOSIS — J30.1 SEASONAL ALLERGIC RHINITIS DUE TO POLLEN: ICD-10-CM

## 2021-10-19 PROCEDURE — 95117 IMMUNOTHERAPY INJECTIONS: CPT

## 2021-10-20 ENCOUNTER — E-VISIT (OUTPATIENT)
Dept: FAMILY MEDICINE | Facility: CLINIC | Age: 31
End: 2021-10-20
Payer: COMMERCIAL

## 2021-10-20 ENCOUNTER — NURSE TRIAGE (OUTPATIENT)
Dept: NURSING | Facility: CLINIC | Age: 31
End: 2021-10-20

## 2021-10-20 DIAGNOSIS — Z20.822 SUSPECTED COVID-19 VIRUS INFECTION: Primary | ICD-10-CM

## 2021-10-20 PROCEDURE — 99421 OL DIG E/M SVC 5-10 MIN: CPT | Performed by: FAMILY MEDICINE

## 2021-10-20 NOTE — PATIENT INSTRUCTIONS
Dear Jorge A Garcia,    Your symptoms show that you could possibly have coronavirus (COVID-19). This illness can cause fever, cough and trouble breathing. Many people get a mild case and get better on their own. Some people can get very sick.     Will I be tested for COVID-19?  We would like to test you for Covid-19 virus. I have placed orders for this test.     To schedule: go to your [facility] home page and scroll down to the section that says  You have an appointment that needs to be scheduled  and click the large green button that says  Schedule Now  and follow the steps to find the next available openings.    If you are unable to complete these [facility] scheduling steps, please call [phone] to schedule your testing.     Return to work/school/ guidance:  Please let your workplace manager and staffing office know when your quarantine ends     We can t give you an exact date as it depends on the above. You can calculate this on your own or work with your manager/staffing office to calculate this. (For example if you were exposed on 10/4, you would have to quarantine for 14 full days. That would be through 10/18. You could return on 10/19.)      If you receive a positive COVID-19 test result, follow the guidance of the those who are giving you the results. Usually the return to work is 10 (or in some cases 20 days from symptom onset.) If you work at [facility] [employer], you must also be cleared by Employee Occupational Health and Safety to return to work.        If you receive a negative COVID-19 test result and did not have a high risk exposure to someone with a known positive COVID-19 test, you can return to work once you're free of fever for 24 hours without fever-reducing medication and your symptoms are improving or resolved.      If you receive a negative COVID-19 test and If you had a high risk exposure to someone who has tested positive for COVID-19 then you can return to work 14 days after your  last contact with the positive individual    Note: If you have ongoing exposure to the covid positive person, this quarantine period may be more than 14 days. (For example, if you are continued to be exposed to your child who tested positive and cannot isolate from them, then the quarantine of 7-14 days can't start until your child is no longer contagious. This is typically 10 days from onset of the child's symptoms. So the total duration may be 17-24 days in this case.)    Sign up for [employer].   We know it's scary to hear that you might have COVID-19. We want to track your symptoms to make sure you're okay over the next 2 weeks. Please look for an email from [employer]--this is a free, online program that we'll use to keep in touch. To sign up, follow the link in the email you will receive. Learn more at [email or web address]    How can I take care of myself?    Get lots of rest. Drink extra fluids (unless a doctor has told you not to)    Take Tylenol (acetaminophen) or ibuprofen for fever or pain. If you have liver or kidney problems, ask your family doctor if it's okay to take Tylenol o ibuprofen    If you have other health problems (like cancer, heart failure, an organ transplant or severe kidney disease): Call your specialty clinic if you don't feel better in the next 2 days.    Know when to call 911. Emergency warning signs include:  o Trouble breathing or shortness of breath  o Pain or pressure in the chest that doesn't go away  o Feeling confused like you haven't felt before, or not being able to wake up  o Bluish-colored lips or face    Where can I get more information?  [facility] - About COVID-19:   [email or web address]    CDC - What to Do If You're Sick:   www.cdc.gov/coronavirus/2019-ncov/about/steps-when-sick.html    October 20, 2021  RE:  Jorge A Garcia                                                                                                                   [address] [facility] [address]      To whom it may concern:    I evaluated Jorge A Garcia on October 20, 2021. Jorge A Garcia should be excused from work/school.     They should let their workplace manager and staffing office know when their quarantine ends.    We can not give an exact date as it depends on the information below. They can calculate this on their own or work with their manager/staffing office to calculate this. (For example if they were exposed on 10/04, they would have to quarantine for 14 full days. That would be through 10/18. They could return on 10/19.)    Quarantine Guidelines:      If patient receives a positive COVID-19 test result, they should follow the guidance of those who are giving the results. Usually the return to work is 10 (or in some cases 20 days from symptom onset.) If they work at [employer], they must be cleared by Employee Occupational Health and Safety to return to work.        If patient receives a negative COVID-19 test result and did not have a high risk exposure to someone with a known positive COVID-19 test, they can return to work once they're free of fever for 24 hours without fever-reducing medication and their symptoms are improving or resolved.      If patient receives a negative COVID-19 test and if they had a high risk exposure to someone who has tested positive for COVID-19 then they can return to work 14 days after their last contact with the positive individual    Note: If there is ongoing exposure to the covid positive person, this quarantine period may be longer than 14 days. (For example, if they are continually exposed to their child, who tested positive and cannot isolate from them, then the quarantine of 7-14 days can't start until their child is no longer contagious. This is typically 10 days from onset to the child's symptoms. So the total duration may be 17-24 days in this case.)     Sincerely,  Bernard Salinas MD

## 2021-10-20 NOTE — TELEPHONE ENCOUNTER
Nurse Triage SBAR    Is this a 2nd Level Triage? NO    Situation     Patient calling wanting to get tested for COVID-19    Background     Fully vaccinated  Sx started last Thursday  Denies exposure to COVID-19    Assessment     Fatigue  Muscle Aches/body aches  Diarrhea over this past weekend x2  Mild headache  Warm last night, does not have thermometer at home.   Chills last night   Denies chest pain or SOB.       Recommendation Per protocol, recommendations are home care, education given to patient. Patient is advised to get tested for COVID-19. E-visit or telephone visit recommended. Patient would prefer to submit E-visit.     Protocol Recommended Disposition: eVisit Referral /Virtual visit to get tested for COVID-19. Advised patient to call back if he develops any new or worsening sx. Patient verbalized understanding and agrees with plan.     Sonia Scott RN, BSN Nurse Triage Advisor 3:38 PM 10/20/2021     Reason for Disposition    COVID-19 Testing, questions about    Additional Information    Negative: Difficult to awaken or acting confused (e.g., disoriented, slurred speech)    Negative: SEVERE difficulty breathing (e.g., struggling for each breath, speaks in single words)    Negative: Bluish (or gray) lips or face now    Negative: Shock suspected (e.g., cold/pale/clammy skin, too weak to stand, low BP, rapid pulse)    Negative: Sounds like a life-threatening emergency to the triager    Negative: [1] COVID-19 exposure AND [2] no symptoms    Negative: COVID-19 vaccine reaction suspected (e.g., fever, headache, muscle aches) occurring 1 to 3 days after getting vaccine    Negative: COVID-19 vaccine, questions about    Negative: [1] Lives with someone known to have influenza (flu test positive) AND [2] flu-like symptoms (e.g., cough, runny nose, sore throat, SOB; with or without fever)    Negative: [1] Adult with possible COVID-19 symptoms AND [2] triager concerned about severity of symptoms or other  causes    Negative: COVID-19 and breastfeeding, questions about    Negative: MODERATE difficulty breathing (e.g., speaks in phrases, SOB even at rest, pulse 100-120)    Negative: SEVERE or constant chest pain or pressure (Exception: mild central chest pain, present only when coughing)    Negative: MILD difficulty breathing (e.g., minimal/no SOB at rest, SOB with walking, pulse <100)    Negative: Chest pain or pressure    Negative: [1] Headache AND [2] stiff neck (can't touch chin to chest)    Negative: Patient sounds very sick or weak to the triager    Negative: Fever > 103 F (39.4 C)    Negative: [1] Fever > 101 F (38.3 C) AND [2] age > 60 years    Negative: [1] Fever > 100.0 F (37.8 C) AND [2] bedridden (e.g., nursing home patient, CVA, chronic illness, recovering from surgery)    Negative: HIGH RISK for severe COVID complications (e.g., age > 64 years, obesity with BMI > 25, pregnant, chronic lung disease or other chronic medical condition)  (Exception: Already seen by PCP and no new or worsening symptoms.)    Negative: [1] HIGH RISK patient AND [2] influenza is widespread in the community AND [3] ONE OR MORE respiratory symptoms: cough, sore throat, runny or stuffy nose    Negative: [1] HIGH RISK patient AND [2] influenza exposure within the last 7 days AND [3] ONE OR MORE respiratory symptoms: cough, sore throat, runny or stuffy nose    Negative: [1] Continuous (nonstop) coughing interferes with work or school AND [2] no improvement using cough treatment per protocol    Negative: [1] Fever returns after gone for over 24 hours AND [2] symptoms worse or not improved    Negative: Fever present > 3 days (72 hours)    Negative: [1] COVID-19 infection suspected by caller or triager AND [2] mild symptoms (cough, fever, or others) AND [3] negative COVID-19 rapid test    Negative: Cough present > 3 weeks    Protocols used: CORONAVIRUS (COVID-19) DIAGNOSED OR [id number] 8.25.2021    COVID 19 Nurse Triage Plan/Patient  Instructions    Please be aware that novel coronavirus (COVID-19) may be circulating in the community. If you develop symptoms such as fever, cough, or SOB or if you have concerns about the presence of another infection including coronavirus (COVID-19), please contact your health care provider or visit [email or web address].     Disposition/Instructions    Virtual Visit with provider recommended. Reference Visit Selection Guide.    Thank you for taking steps to prevent the spread of this virus.  o Limit your contact with others.  o Wear a simple mask to cover your cough.  o Wash your hands well and often.    Resources    [facility]: About COVID-19: [email or web address]    CDC: What to Do If You're Sick: www.cdc.gov/coronavirus/2019-ncov/about/steps-when-sick.html    CDC: Ending Home Isolation: www.cdc.gov/coronavirus/2019-ncov/hcp/disposition-in-home-patients.html     CDC: Caring for Someone: www.cdc.gov/coronavirus/2019-ncov/if-you-are-sick/care-for-someone.html     [facility]: Interim Guidance for Hospital Discharge to Home: www.health.[facility].[place]/diseases/coronavirus/hcp/hospdischarge.pdf    [facility] clinical trials (COVID-19 research studies): [email or web address]-clinical-trials     Below are the COVID-19 hotlines at the [place] [facility] of [facility] ([facility]). Interpreters are available.   o For health questions: Call 088-217-2385 or 1-495.444.1328 (7 a.m. to 7 p.m.)  o For questions about schools and childcare: Call 886-924-3173 or 1-373.126.1949 (7 a.m. to 7 p.m.)

## 2021-10-21 ENCOUNTER — LAB (OUTPATIENT)
Dept: FAMILY MEDICINE | Facility: CLINIC | Age: 31
End: 2021-10-21
Attending: FAMILY MEDICINE
Payer: COMMERCIAL

## 2021-10-21 DIAGNOSIS — Z20.822 SUSPECTED COVID-19 VIRUS INFECTION: ICD-10-CM

## 2021-10-21 LAB — SARS-COV-2 RNA RESP QL NAA+PROBE: NEGATIVE

## 2021-10-21 PROCEDURE — U0003 INFECTIOUS AGENT DETECTION BY NUCLEIC ACID (DNA OR RNA); SEVERE ACUTE RESPIRATORY SYNDROME CORONAVIRUS 2 (SARS-COV-2) (CORONAVIRUS DISEASE [COVID-19]), AMPLIFIED PROBE TECHNIQUE, MAKING USE OF HIGH THROUGHPUT TECHNOLOGIES AS DESCRIBED BY CMS-2020-01-R: HCPCS

## 2021-10-21 PROCEDURE — U0005 INFEC AGEN DETEC AMPLI PROBE: HCPCS

## 2021-10-22 ENCOUNTER — TELEPHONE (OUTPATIENT)
Dept: ALLERGY | Facility: CLINIC | Age: 31
End: 2021-10-22

## 2021-10-22 NOTE — TELEPHONE ENCOUNTER
Reason for Call:  Other call back    Detailed comments: Patient called in having concerns of allergic reactions to allergy shots maybe. Yesterday and since two days ago from allergy shots, he has been very ill and fatigue and having joint pain. Pt got tested for COVID and was negative. He would like to mention that since the Oct 15th 2021, he has been having severe diarrhea coming and going. It's usually two days after the allergy shots.    Please call back and advise.    Phone Number Patient can be reached at: Cell number on file:    Telephone Information:   Mobile [phone]       Best Time: ANY    Can we leave a detailed message on this number? YES    Call taken on 10/22/2021 at 8:41 AM by Cj Vela

## 2021-10-25 ENCOUNTER — VIRTUAL VISIT (OUTPATIENT)
Dept: FAMILY MEDICINE | Facility: CLINIC | Age: 31
End: 2021-10-25
Payer: COMMERCIAL

## 2021-10-25 DIAGNOSIS — A08.4 VIRAL GASTROENTERITIS: Primary | ICD-10-CM

## 2021-10-25 PROCEDURE — 99213 OFFICE O/P EST LOW 20 MIN: CPT | Mod: GT | Performed by: STUDENT IN AN ORGANIZED HEALTH CARE EDUCATION/TRAINING PROGRAM

## 2021-10-25 NOTE — PROGRESS NOTES
Jorge A is a 31 year old who is being evaluated via a billable video visit.      How would you like to obtain your AVS? MyChart  If the video visit is dropped, the invitation should be resent by: Text to cell phone: [phone]  Will anyone else be joining your video visit? No      Video Start Time: 12:08 PM    Assessment & Plan     31-year-old male with possible history of ADHD, allergic rhinitis, asthma who presents via video visit for 2-week history of diarrhea, nausea, GI discomfort, fatigue, joint pain.  Really improved now though and feeling much better as we talked today.  By history I would suspect a viral gastroenteritis.  No history of similar symptoms to suggest a more chronic disease.  For now I recommended he monitor symptoms and see if these recur.  Would recommend an in person exam and possibly some testing if they do.  Discussed red flag symptoms to get into clinic emergently.    1. Viral gastroenteritis    Mckinley Alcazar MD  [facility]   Jorge A is a 31 year old who presents for the following health issues    Chief Complaint   Patient presents with     concerns     diahrrea, nausea, gi discomfort and fatigue for last two weeks        HPI   Copied from chief complaint  Oct 15-Oct 17: Diarrhea, nausea, fatigue and GI discomfort.  Oct 20: Diarrhea and GI discomfort has largely passed but Increasing joint pain, fatigue, feeling feverish and chills. Could not go into work.   Oct 21: Increasing joint pain, fatigue, feeling feverish, chills and headache. Could not go into work. Submitted for a PCR COVID 19 taken morning of Oct 21, which was negative for COVID 19.  Oct 22: Continuing joint and muscle pain.    Never had any congestion or upper respiratory issues. Was mildly wheezy Friday. Never happened before.       Objective           Vitals:  No vitals were obtained today due to virtual visit.    Physical Exam   GENERAL: Healthy, alert and no distress  EYES: Eyes  grossly normal to inspection.  No discharge or erythema, or obvious scleral/conjunctival abnormalities.  RESP: No audible wheeze, cough, or visible cyanosis.  No visible retractions or increased work of breathing.    SKIN: Visible skin clear. No significant rash, abnormal pigmentation or lesions.  NEURO: Cranial nerves grossly intact.  Mentation and speech appropriate for age.  PSYCH: Mentation appears normal, affect normal/bright, judgement and insight intact, normal speech and appearance well-groomed.        Video-Visit Details    Type of service:  Video Visit    Video End Time:12:17 PM    Originating Location (pt. Location): Home    Distant Location (provider location):  [facility]     Platform used for Video Visit: Asp[employer]ra

## 2021-10-26 ENCOUNTER — ALLIED HEALTH/NURSE VISIT (OUTPATIENT)
Dept: ALLERGY | Facility: CLINIC | Age: 31
End: 2021-10-26
Payer: COMMERCIAL

## 2021-10-26 DIAGNOSIS — J30.81 ALLERGIC RHINITIS DUE TO CATS: Primary | ICD-10-CM

## 2021-10-26 PROCEDURE — 95117 IMMUNOTHERAPY INJECTIONS: CPT

## 2021-11-02 ENCOUNTER — ALLIED HEALTH/NURSE VISIT (OUTPATIENT)
Dept: ALLERGY | Facility: CLINIC | Age: 31
End: 2021-11-02
Payer: COMMERCIAL

## 2021-11-02 DIAGNOSIS — J30.81 ALLERGIC RHINITIS DUE TO CATS: ICD-10-CM

## 2021-11-02 DIAGNOSIS — J30.1 SEASONAL ALLERGIC RHINITIS DUE TO POLLEN: Primary | ICD-10-CM

## 2021-11-02 PROCEDURE — 95117 IMMUNOTHERAPY INJECTIONS: CPT

## 2021-11-09 ENCOUNTER — ALLIED HEALTH/NURSE VISIT (OUTPATIENT)
Dept: ALLERGY | Facility: CLINIC | Age: 31
End: 2021-11-09
Payer: COMMERCIAL

## 2021-11-09 DIAGNOSIS — J30.1 SEASONAL ALLERGIC RHINITIS DUE TO POLLEN: Primary | ICD-10-CM

## 2021-11-09 PROCEDURE — 95117 IMMUNOTHERAPY INJECTIONS: CPT

## 2021-11-16 ENCOUNTER — OFFICE VISIT (OUTPATIENT)
Dept: ALLERGY | Facility: CLINIC | Age: 31
End: 2021-11-16
Payer: COMMERCIAL

## 2021-11-16 VITALS — TEMPERATURE: 98.6 F | HEART RATE: 67 BPM | OXYGEN SATURATION: 100 %

## 2021-11-16 DIAGNOSIS — J30.1 SEASONAL ALLERGIC RHINITIS DUE TO POLLEN: Primary | ICD-10-CM

## 2021-11-16 PROCEDURE — 95117 IMMUNOTHERAPY INJECTIONS: CPT | Performed by: ALLERGY & IMMUNOLOGY

## 2021-11-16 PROCEDURE — 99213 OFFICE O/P EST LOW 20 MIN: CPT | Mod: 25 | Performed by: ALLERGY & IMMUNOLOGY

## 2021-11-16 RX ORDER — LITHIUM CARBONATE 150 MG/1
150 CAPSULE ORAL DAILY
COMMUNITY
End: 2023-05-30

## 2021-11-16 ASSESSMENT — ASTHMA QUESTIONNAIRES
QUESTION_1 LAST FOUR WEEKS HOW MUCH OF THE TIME DID YOUR ASTHMA KEEP YOU FROM GETTING AS MUCH DONE AT WORK, SCHOOL OR AT HOME: NONE OF THE TIME
QUESTION_2 LAST FOUR WEEKS HOW OFTEN HAVE YOU HAD SHORTNESS OF BREATH: ONCE OR TWICE A WEEK
QUESTION_5 LAST FOUR WEEKS HOW WOULD YOU RATE YOUR ASTHMA CONTROL: COMPLETELY CONTROLLED
QUESTION_3 LAST FOUR WEEKS HOW OFTEN DID YOUR ASTHMA SYMPTOMS (WHEEZING, COUGHING, SHORTNESS OF BREATH, CHEST TIGHTNESS OR PAIN) WAKE YOU UP AT NIGHT OR EARLIER THAN USUAL IN THE MORNING: NOT AT ALL
ACT_TOTALSCORE: 22
QUESTION_4 LAST FOUR WEEKS HOW OFTEN HAVE YOU USED YOUR RESCUE INHALER OR NEBULIZER MEDICATION (SUCH AS ALBUTEROL): TWO OR THREE TIMES PER WEEK

## 2021-11-16 NOTE — PROGRESS NOTES
Subjective       HPI     Chief complaint: Follow-up allergies    History of present illness: This is a pleasant 31-year-old gentleman who is here today for follow-up of allergies.  He is currently on allergy immunotherapy and moving into his yellow vial.  He has experienced a site reactions but no systemic reactions.  Overall he feels his allergy symptoms are controlled.  He does a history of intermittent asthma and uses his albuterol inhaler only with exercise.    Review of Systems         Objective    Pulse 67   Temp 98.6  F (37  C)   SpO2 100%   There is no height or weight on file to calculate BMI.  Physical Exam      Gen: Pleasant male not in acute distress  HEENT: Eyes no erythema of the bulbar or palpebral conjunctiva, no edema.   Skin: No rashes or lesions  Psych: Alert and oriented times 3    Impression report and plan:  1.  Allergic rhinitis    Continue allergy shots.  Continue current medication therapy.  Notify of systemic reaction.  Follow in 6 weeks.

## 2021-11-17 ASSESSMENT — ASTHMA QUESTIONNAIRES: ACT_TOTALSCORE: 22

## 2021-11-23 ENCOUNTER — ALLIED HEALTH/NURSE VISIT (OUTPATIENT)
Dept: ALLERGY | Facility: CLINIC | Age: 31
End: 2021-11-23
Payer: COMMERCIAL

## 2021-11-23 DIAGNOSIS — J30.81 ALLERGY TO DOG DANDER: ICD-10-CM

## 2021-11-23 DIAGNOSIS — J30.1 SEASONAL ALLERGIC RHINITIS DUE TO POLLEN: Primary | ICD-10-CM

## 2021-11-23 DIAGNOSIS — J30.81 ALLERGIC RHINITIS DUE TO CATS: ICD-10-CM

## 2021-11-23 PROCEDURE — 95117 IMMUNOTHERAPY INJECTIONS: CPT

## 2021-11-30 ENCOUNTER — ALLIED HEALTH/NURSE VISIT (OUTPATIENT)
Dept: ALLERGY | Facility: CLINIC | Age: 31
End: 2021-11-30
Payer: COMMERCIAL

## 2021-11-30 DIAGNOSIS — J30.1 SEASONAL ALLERGIC RHINITIS DUE TO POLLEN: Primary | ICD-10-CM

## 2021-11-30 PROCEDURE — 95117 IMMUNOTHERAPY INJECTIONS: CPT

## 2021-12-07 ENCOUNTER — ALLIED HEALTH/NURSE VISIT (OUTPATIENT)
Dept: ALLERGY | Facility: CLINIC | Age: 31
End: 2021-12-07
Payer: COMMERCIAL

## 2021-12-07 DIAGNOSIS — J30.81 ALLERGIC RHINITIS DUE TO CATS: Primary | ICD-10-CM

## 2021-12-07 DIAGNOSIS — J30.81 ALLERGY TO DOG DANDER: ICD-10-CM

## 2021-12-07 PROCEDURE — 95117 IMMUNOTHERAPY INJECTIONS: CPT

## 2021-12-14 ENCOUNTER — ALLIED HEALTH/NURSE VISIT (OUTPATIENT)
Dept: ALLERGY | Facility: CLINIC | Age: 31
End: 2021-12-14
Payer: COMMERCIAL

## 2021-12-14 DIAGNOSIS — J30.81 ALLERGIC RHINITIS DUE TO CATS: Primary | ICD-10-CM

## 2021-12-14 DIAGNOSIS — J30.81 ALLERGY TO DOG DANDER: ICD-10-CM

## 2021-12-14 DIAGNOSIS — J30.1 SEASONAL ALLERGIC RHINITIS DUE TO POLLEN: ICD-10-CM

## 2021-12-14 PROCEDURE — 95117 IMMUNOTHERAPY INJECTIONS: CPT

## 2021-12-21 ENCOUNTER — ALLIED HEALTH/NURSE VISIT (OUTPATIENT)
Dept: ALLERGY | Facility: CLINIC | Age: 31
End: 2021-12-21
Payer: COMMERCIAL

## 2021-12-21 DIAGNOSIS — J30.81 ALLERGY TO DOG DANDER: ICD-10-CM

## 2021-12-21 DIAGNOSIS — J30.1 SEASONAL ALLERGIC RHINITIS DUE TO POLLEN: ICD-10-CM

## 2021-12-21 DIAGNOSIS — J30.81 ALLERGIC RHINITIS DUE TO CATS: Primary | ICD-10-CM

## 2021-12-21 PROCEDURE — 95117 IMMUNOTHERAPY INJECTIONS: CPT

## 2021-12-28 ENCOUNTER — OFFICE VISIT (OUTPATIENT)
Dept: ALLERGY | Facility: CLINIC | Age: 31
End: 2021-12-28
Payer: COMMERCIAL

## 2021-12-28 VITALS — HEART RATE: 84 BPM | OXYGEN SATURATION: 99 % | RESPIRATION RATE: 16 BRPM

## 2021-12-28 DIAGNOSIS — J30.1 SEASONAL ALLERGIC RHINITIS DUE TO POLLEN: ICD-10-CM

## 2021-12-28 DIAGNOSIS — J30.81 ALLERGY TO DOG DANDER: ICD-10-CM

## 2021-12-28 DIAGNOSIS — J30.81 ALLERGIC RHINITIS DUE TO CATS: Primary | ICD-10-CM

## 2021-12-28 PROCEDURE — 99213 OFFICE O/P EST LOW 20 MIN: CPT | Mod: 25 | Performed by: ALLERGY & IMMUNOLOGY

## 2021-12-28 PROCEDURE — 95117 IMMUNOTHERAPY INJECTIONS: CPT | Performed by: ALLERGY & IMMUNOLOGY

## 2021-12-28 NOTE — PROGRESS NOTES
Subjective       HPI     Chief complaint: Follow-up allergies    History of present illness: This is a pleasant 31-year-old gentleman who is here today for follow-up of allergies.  He is moving into his red vial.  He has noted some site reactions but no systemic reactions.  He has felt he has had some improvement in his allergy symptoms.  Asthma is well controlled.  Mostly with exercise.  He denies any current cough, wheeze or shortness of breath.    Review of Systems         Objective    Pulse 84   Resp 16   SpO2 99%   There is no height or weight on file to calculate BMI.  Physical Exam      Gen: Pleasant male not in acute distress  HEENT: Eyes no erythema of the bulbar or palpebral conjunctiva, no edema.   Skin: No rashes or lesions  Psych: Alert and oriented times 3    Impression report and plan:  1.  Allergic rhinitis  2.  Exercise-induced bronchospasm    Continue allergy shots.  Continue current medication therapy.  Notify of systemic reaction.  Follow in 6 months

## 2021-12-28 NOTE — LETTER
12/28/2021         RE: Jorge A Garcia  [address] [address]        Dear Colleague,    Thank you for referring your patient, Jorge A Garcia, to the [facility]. Please see a copy of my visit note below.          Subjective       HPI     Chief complaint: Follow-up allergies    History of present illness: This is a pleasant 31-year-old gentleman who is here today for follow-up of allergies.  He is moving into his red vial.  He has noted some site reactions but no systemic reactions.  He has felt he has had some improvement in his allergy symptoms.  Asthma is well controlled.  Mostly with exercise.  He denies any current cough, wheeze or shortness of breath.    Review of Systems         Objective    Pulse 84   Resp 16   SpO2 99%   There is no height or weight on file to calculate BMI.  Physical Exam      Gen: Pleasant male not in acute distress  HEENT: Eyes no erythema of the bulbar or palpebral conjunctiva, no edema.   Skin: No rashes or lesions  Psych: Alert and oriented times 3    Impression report and plan:  1.  Allergic rhinitis  2.  Exercise-induced bronchospasm    Continue allergy shots.  Continue current medication therapy.  Notify of systemic reaction.  Follow in 6 months        Again, thank you for allowing me to participate in the care of your patient.        Sincerely,        Arabella GRAVES MD

## 2022-01-04 ENCOUNTER — ALLIED HEALTH/NURSE VISIT (OUTPATIENT)
Dept: ALLERGY | Facility: CLINIC | Age: 32
End: 2022-01-04
Payer: COMMERCIAL

## 2022-01-04 DIAGNOSIS — J30.81 ALLERGIC RHINITIS DUE TO CATS: Primary | ICD-10-CM

## 2022-01-04 PROCEDURE — 95117 IMMUNOTHERAPY INJECTIONS: CPT

## 2022-01-11 ENCOUNTER — ALLIED HEALTH/NURSE VISIT (OUTPATIENT)
Dept: ALLERGY | Facility: CLINIC | Age: 32
End: 2022-01-11
Payer: COMMERCIAL

## 2022-01-11 DIAGNOSIS — J30.1 SEASONAL ALLERGIC RHINITIS DUE TO POLLEN: ICD-10-CM

## 2022-01-11 DIAGNOSIS — J30.81 ALLERGIC RHINITIS DUE TO CATS: Primary | ICD-10-CM

## 2022-01-11 PROCEDURE — 95117 IMMUNOTHERAPY INJECTIONS: CPT

## 2022-01-11 PROCEDURE — 99207 PR DROP WITH A PROCEDURE: CPT

## 2022-01-18 ENCOUNTER — ALLIED HEALTH/NURSE VISIT (OUTPATIENT)
Dept: ALLERGY | Facility: CLINIC | Age: 32
End: 2022-01-18
Payer: COMMERCIAL

## 2022-01-18 DIAGNOSIS — J30.81 ALLERGY TO DOG DANDER: ICD-10-CM

## 2022-01-18 DIAGNOSIS — J30.81 ALLERGIC RHINITIS DUE TO CATS: Primary | ICD-10-CM

## 2022-01-18 DIAGNOSIS — J30.1 SEASONAL ALLERGIC RHINITIS DUE TO POLLEN: ICD-10-CM

## 2022-01-18 PROCEDURE — 95117 IMMUNOTHERAPY INJECTIONS: CPT

## 2022-01-25 ENCOUNTER — ALLIED HEALTH/NURSE VISIT (OUTPATIENT)
Dept: ALLERGY | Facility: CLINIC | Age: 32
End: 2022-01-25
Payer: COMMERCIAL

## 2022-01-25 DIAGNOSIS — J30.81 ALLERGIC RHINITIS DUE TO CATS: Primary | ICD-10-CM

## 2022-01-25 PROCEDURE — 95117 IMMUNOTHERAPY INJECTIONS: CPT

## 2022-02-01 ENCOUNTER — ALLIED HEALTH/NURSE VISIT (OUTPATIENT)
Dept: ALLERGY | Facility: CLINIC | Age: 32
End: 2022-02-01
Payer: COMMERCIAL

## 2022-02-01 DIAGNOSIS — J30.81 ALLERGIC RHINITIS DUE TO CATS: Primary | ICD-10-CM

## 2022-02-01 PROCEDURE — 95117 IMMUNOTHERAPY INJECTIONS: CPT

## 2022-02-15 ENCOUNTER — ALLIED HEALTH/NURSE VISIT (OUTPATIENT)
Dept: ALLERGY | Facility: CLINIC | Age: 32
End: 2022-02-15
Payer: COMMERCIAL

## 2022-02-15 DIAGNOSIS — J30.81 ALLERGIC RHINITIS DUE TO ANIMALS: ICD-10-CM

## 2022-02-15 DIAGNOSIS — J30.81 ALLERGIC RHINITIS DUE TO CATS: Primary | ICD-10-CM

## 2022-02-15 DIAGNOSIS — J30.1 SEASONAL ALLERGIC RHINITIS DUE TO POLLEN: ICD-10-CM

## 2022-02-15 PROCEDURE — 95117 IMMUNOTHERAPY INJECTIONS: CPT

## 2022-02-22 ENCOUNTER — ALLIED HEALTH/NURSE VISIT (OUTPATIENT)
Dept: ALLERGY | Facility: CLINIC | Age: 32
End: 2022-02-22
Payer: COMMERCIAL

## 2022-02-22 DIAGNOSIS — J30.81 ALLERGIC RHINITIS DUE TO CATS: Primary | ICD-10-CM

## 2022-02-22 PROCEDURE — 95117 IMMUNOTHERAPY INJECTIONS: CPT

## 2022-03-01 ENCOUNTER — ALLIED HEALTH/NURSE VISIT (OUTPATIENT)
Dept: ALLERGY | Facility: CLINIC | Age: 32
End: 2022-03-01
Payer: COMMERCIAL

## 2022-03-01 DIAGNOSIS — J30.81 ALLERGIC RHINITIS DUE TO CATS: Primary | ICD-10-CM

## 2022-03-01 DIAGNOSIS — J30.1 SEASONAL ALLERGIC RHINITIS DUE TO POLLEN: ICD-10-CM

## 2022-03-01 PROCEDURE — 95117 IMMUNOTHERAPY INJECTIONS: CPT

## 2022-03-15 ENCOUNTER — ALLIED HEALTH/NURSE VISIT (OUTPATIENT)
Dept: ALLERGY | Facility: CLINIC | Age: 32
End: 2022-03-15
Payer: COMMERCIAL

## 2022-03-15 DIAGNOSIS — J30.81 ALLERGIC RHINITIS DUE TO CATS: Primary | ICD-10-CM

## 2022-03-15 DIAGNOSIS — J30.1 SEASONAL ALLERGIC RHINITIS DUE TO POLLEN: ICD-10-CM

## 2022-03-15 DIAGNOSIS — J30.81 ALLERGIC RHINITIS DUE TO ANIMALS: ICD-10-CM

## 2022-03-15 PROCEDURE — 95117 IMMUNOTHERAPY INJECTIONS: CPT

## 2022-03-29 ENCOUNTER — ALLIED HEALTH/NURSE VISIT (OUTPATIENT)
Dept: ALLERGY | Facility: CLINIC | Age: 32
End: 2022-03-29
Payer: COMMERCIAL

## 2022-03-29 DIAGNOSIS — J30.1 SEASONAL ALLERGIC RHINITIS DUE TO POLLEN: ICD-10-CM

## 2022-03-29 DIAGNOSIS — J30.81 ALLERGIC RHINITIS DUE TO ANIMALS: ICD-10-CM

## 2022-03-29 DIAGNOSIS — J30.81 ALLERGIC RHINITIS DUE TO CATS: Primary | ICD-10-CM

## 2022-03-29 PROCEDURE — 95117 IMMUNOTHERAPY INJECTIONS: CPT

## 2022-04-26 ENCOUNTER — ALLIED HEALTH/NURSE VISIT (OUTPATIENT)
Dept: ALLERGY | Facility: CLINIC | Age: 32
End: 2022-04-26
Payer: COMMERCIAL

## 2022-04-26 DIAGNOSIS — J30.1 SEASONAL ALLERGIC RHINITIS DUE TO POLLEN: ICD-10-CM

## 2022-04-26 DIAGNOSIS — J30.81 ALLERGIC RHINITIS DUE TO ANIMALS: ICD-10-CM

## 2022-04-26 DIAGNOSIS — J30.81 ALLERGIC RHINITIS DUE TO CATS: Primary | ICD-10-CM

## 2022-04-26 PROCEDURE — 95117 IMMUNOTHERAPY INJECTIONS: CPT

## 2022-05-24 ENCOUNTER — ALLIED HEALTH/NURSE VISIT (OUTPATIENT)
Dept: ALLERGY | Facility: CLINIC | Age: 32
End: 2022-05-24
Payer: COMMERCIAL

## 2022-05-24 DIAGNOSIS — J30.81 ALLERGIC RHINITIS DUE TO CATS: Primary | ICD-10-CM

## 2022-05-24 DIAGNOSIS — J30.1 SEASONAL ALLERGIC RHINITIS DUE TO POLLEN: ICD-10-CM

## 2022-05-24 PROCEDURE — 95117 IMMUNOTHERAPY INJECTIONS: CPT

## 2022-06-14 ENCOUNTER — MEDICAL CORRESPONDENCE (OUTPATIENT)
Dept: HEALTH INFORMATION MANAGEMENT | Facility: CLINIC | Age: 32
End: 2022-06-14

## 2022-06-16 DIAGNOSIS — J30.81 ALLERGY TO DOG DANDER: ICD-10-CM

## 2022-06-16 DIAGNOSIS — J30.1 SEASONAL ALLERGIC RHINITIS DUE TO POLLEN: ICD-10-CM

## 2022-06-16 DIAGNOSIS — J30.81 ALLERGIC RHINITIS DUE TO CATS: Primary | ICD-10-CM

## 2022-06-16 PROCEDURE — 95165 ANTIGEN THERAPY SERVICES: CPT | Performed by: ALLERGY & IMMUNOLOGY

## 2022-06-16 NOTE — PROGRESS NOTES
AP DOG/RW  1:1 V/V EXP 6/14/2023  GRASS/TREES/WEEDS/CAT  1:1 V/V EXP 6/14/2023    CHARGED 20 UNITS  CHECKED BY IB

## 2022-06-21 ENCOUNTER — OFFICE VISIT (OUTPATIENT)
Dept: ALLERGY | Facility: CLINIC | Age: 32
End: 2022-06-21
Payer: COMMERCIAL

## 2022-06-21 VITALS — RESPIRATION RATE: 16 BRPM | HEART RATE: 77 BPM | OXYGEN SATURATION: 98 %

## 2022-06-21 DIAGNOSIS — J45.990 EXERCISE INDUCED BRONCHOSPASM: ICD-10-CM

## 2022-06-21 DIAGNOSIS — J30.81 ALLERGIC RHINITIS DUE TO CATS: Primary | ICD-10-CM

## 2022-06-21 DIAGNOSIS — J30.1 SEASONAL ALLERGIC RHINITIS DUE TO POLLEN: ICD-10-CM

## 2022-06-21 DIAGNOSIS — J30.81 ALLERGY TO DOG DANDER: ICD-10-CM

## 2022-06-21 PROCEDURE — 95117 IMMUNOTHERAPY INJECTIONS: CPT | Performed by: ALLERGY & IMMUNOLOGY

## 2022-06-21 PROCEDURE — 99213 OFFICE O/P EST LOW 20 MIN: CPT | Mod: 25 | Performed by: ALLERGY & IMMUNOLOGY

## 2022-06-21 RX ORDER — VALPROIC ACID 250 MG/1
500 CAPSULE, LIQUID FILLED ORAL AT BEDTIME
COMMUNITY

## 2022-06-21 RX ORDER — ALBUTEROL SULFATE 90 UG/1
2 AEROSOL, METERED RESPIRATORY (INHALATION) EVERY 6 HOURS PRN
Qty: 16 G | Refills: 1 | Status: SHIPPED | OUTPATIENT
Start: 2022-06-21 | End: 2023-10-16

## 2022-06-21 ASSESSMENT — ASTHMA QUESTIONNAIRES
ACT_TOTALSCORE: 22
QUESTION_5 LAST FOUR WEEKS HOW WOULD YOU RATE YOUR ASTHMA CONTROL: COMPLETELY CONTROLLED
ACT_TOTALSCORE: 22
QUESTION_2 LAST FOUR WEEKS HOW OFTEN HAVE YOU HAD SHORTNESS OF BREATH: NOT AT ALL
QUESTION_1 LAST FOUR WEEKS HOW MUCH OF THE TIME DID YOUR ASTHMA KEEP YOU FROM GETTING AS MUCH DONE AT WORK, SCHOOL OR AT HOME: A LITTLE OF THE TIME
ACUTE_EXACERBATION_TODAY: NO
QUESTION_3 LAST FOUR WEEKS HOW OFTEN DID YOUR ASTHMA SYMPTOMS (WHEEZING, COUGHING, SHORTNESS OF BREATH, CHEST TIGHTNESS OR PAIN) WAKE YOU UP AT NIGHT OR EARLIER THAN USUAL IN THE MORNING: NOT AT ALL
QUESTION_4 LAST FOUR WEEKS HOW OFTEN HAVE YOU USED YOUR RESCUE INHALER OR NEBULIZER MEDICATION (SUCH AS ALBUTEROL): TWO OR THREE TIMES PER WEEK

## 2022-06-21 NOTE — PROGRESS NOTES
Bri Jules is a 32 year old, presenting for the following health issues:  Allergy Injection and RECHECK      HPI     Chief complaint: Allergy follow-up    History of present illness: This is a pleasant 32-year-old gentleman here today for follow-up of his allergies.  He has a history of exercise-induced bronchospasm and allergies.  He reports his allergy symptoms have greatly improved especially around animals.  He has had no systemic reactions to his allergy shots.  He uses antihistamines only as needed.  He uses his albuterol prior to exercise but no cough, wheeze or shortness of breath outside of exercise.  No other allergy concerns.    Review of Systems         Objective    Pulse 77   Resp 16   SpO2 98%   There is no height or weight on file to calculate BMI.  Physical Exam   Gen: Pleasant male not in acute distress  HEENT: Eyes no erythema of the bulbar or palpebral conjunctiva, no edema.  Nose: No congestion, mucosa normal. Mouth: Throat clear, no lip or tongue edema.     Respiratory: Clear to auscultation bilaterally, no adventitious breath sounds    Skin: No rashes or lesions  Psych: Alert and oriented times 3    Impression report and plan:  1.  Allergic rhinitis  2.  Exercise-induced bronchospasm    Continue allergy shots.  Continue current medication therapy.  Notify of systemic reaction.  Follow in 1 year.                .  ..

## 2022-06-21 NOTE — LETTER
6/21/2022         RE: Jorge A Garcia  [address] [address]        Dear Colleague,    Thank you for referring your patient, Jorge A Garcia, to the [facility]. Please see a copy of my visit note below.          Subjective   Jorge A is a 32 year old, presenting for the following health issues:  Allergy Injection and RECHECK      HPI     Chief complaint: Allergy follow-up    History of present illness: This is a pleasant 32-year-old gentleman here today for follow-up of his allergies.  He has a history of exercise-induced bronchospasm and allergies.  He reports his allergy symptoms have greatly improved especially around animals.  He has had no systemic reactions to his allergy shots.  He uses antihistamines only as needed.  He uses his albuterol prior to exercise but no cough, wheeze or shortness of breath outside of exercise.  No other allergy concerns.    Review of Systems         Objective    Pulse 77   Resp 16   SpO2 98%   There is no height or weight on file to calculate BMI.  Physical Exam   Gen: Pleasant male not in acute distress  HEENT: Eyes no erythema of the bulbar or palpebral conjunctiva, no edema.  Nose: No congestion, mucosa normal. Mouth: Throat clear, no lip or tongue edema.     Respiratory: Clear to auscultation bilaterally, no adventitious breath sounds    Skin: No rashes or lesions  Psych: Alert and oriented times 3    Impression report and plan:  1.  Allergic rhinitis  2.  Exercise-induced bronchospasm    Continue allergy shots.  Continue current medication therapy.  Notify of systemic reaction.  Follow in 1 year.                .  ..      Again, thank you for allowing me to participate in the care of your patient.        Sincerely,        Arabella GRAVES MD

## 2022-07-05 ENCOUNTER — ALLIED HEALTH/NURSE VISIT (OUTPATIENT)
Dept: ALLERGY | Facility: CLINIC | Age: 32
End: 2022-07-05
Payer: COMMERCIAL

## 2022-07-05 DIAGNOSIS — J30.81 ALLERGIC RHINITIS DUE TO CATS: Primary | ICD-10-CM

## 2022-07-05 PROCEDURE — 99207 PR DROP WITH A PROCEDURE: CPT

## 2022-07-05 PROCEDURE — 95117 IMMUNOTHERAPY INJECTIONS: CPT

## 2022-07-09 ENCOUNTER — HEALTH MAINTENANCE LETTER (OUTPATIENT)
Age: 32
End: 2022-07-09

## 2022-07-19 ENCOUNTER — ALLIED HEALTH/NURSE VISIT (OUTPATIENT)
Dept: ALLERGY | Facility: CLINIC | Age: 32
End: 2022-07-19
Payer: COMMERCIAL

## 2022-07-19 DIAGNOSIS — J30.1 SEASONAL ALLERGIC RHINITIS DUE TO POLLEN: ICD-10-CM

## 2022-07-19 DIAGNOSIS — J30.81 ALLERGIC RHINITIS DUE TO CATS: Primary | ICD-10-CM

## 2022-07-19 DIAGNOSIS — J30.81 ALLERGY TO DOG DANDER: ICD-10-CM

## 2022-07-19 PROCEDURE — 95117 IMMUNOTHERAPY INJECTIONS: CPT

## 2022-08-16 ENCOUNTER — ALLIED HEALTH/NURSE VISIT (OUTPATIENT)
Dept: ALLERGY | Facility: CLINIC | Age: 32
End: 2022-08-16
Payer: COMMERCIAL

## 2022-08-16 DIAGNOSIS — J30.1 SEASONAL ALLERGIC RHINITIS DUE TO POLLEN: ICD-10-CM

## 2022-08-16 DIAGNOSIS — J30.81 ALLERGIC RHINITIS DUE TO CATS: Primary | ICD-10-CM

## 2022-08-16 DIAGNOSIS — J30.81 ALLERGY TO DOG DANDER: ICD-10-CM

## 2022-08-16 PROCEDURE — 95117 IMMUNOTHERAPY INJECTIONS: CPT

## 2022-08-30 ENCOUNTER — ALLIED HEALTH/NURSE VISIT (OUTPATIENT)
Dept: ALLERGY | Facility: CLINIC | Age: 32
End: 2022-08-30
Payer: COMMERCIAL

## 2022-08-30 DIAGNOSIS — J30.1 SEASONAL ALLERGIC RHINITIS DUE TO POLLEN: ICD-10-CM

## 2022-08-30 DIAGNOSIS — J30.81 ALLERGIC RHINITIS DUE TO CATS: Primary | ICD-10-CM

## 2022-08-30 DIAGNOSIS — J30.81 ALLERGY TO DOG DANDER: ICD-10-CM

## 2022-08-30 PROCEDURE — 95117 IMMUNOTHERAPY INJECTIONS: CPT

## 2022-09-03 ENCOUNTER — HEALTH MAINTENANCE LETTER (OUTPATIENT)
Age: 32
End: 2022-09-03

## 2022-09-27 ENCOUNTER — ALLIED HEALTH/NURSE VISIT (OUTPATIENT)
Dept: ALLERGY | Facility: CLINIC | Age: 32
End: 2022-09-27
Payer: COMMERCIAL

## 2022-09-27 DIAGNOSIS — J30.81 ALLERGIC RHINITIS DUE TO CATS: Primary | ICD-10-CM

## 2022-09-27 PROCEDURE — 95117 IMMUNOTHERAPY INJECTIONS: CPT

## 2022-11-08 ENCOUNTER — ALLIED HEALTH/NURSE VISIT (OUTPATIENT)
Dept: ALLERGY | Facility: CLINIC | Age: 32
End: 2022-11-08
Payer: COMMERCIAL

## 2022-11-08 DIAGNOSIS — J30.81 ALLERGIC RHINITIS DUE TO CATS: Primary | ICD-10-CM

## 2022-11-08 PROCEDURE — 95117 IMMUNOTHERAPY INJECTIONS: CPT

## 2022-11-08 NOTE — PROGRESS NOTES
Jorge A Garcia presents to clinic today at the request of Arabella Paige MD (ordering provider) for Allergy Immunotherapy injection(s).       This service provided today was under the care of Arabella Paige MD; the supervising provider of the day; who was available if needed.    Gladys Mckeon

## 2022-11-11 ENCOUNTER — ALLIED HEALTH/NURSE VISIT (OUTPATIENT)
Dept: ALLERGY | Facility: CLINIC | Age: 32
End: 2022-11-11
Payer: COMMERCIAL

## 2022-11-11 DIAGNOSIS — J30.81 ALLERGIC RHINITIS DUE TO CATS: Primary | ICD-10-CM

## 2022-11-11 DIAGNOSIS — J30.1 SEASONAL ALLERGIC RHINITIS DUE TO POLLEN: ICD-10-CM

## 2022-11-11 DIAGNOSIS — J30.81 ALLERGY TO DOG DANDER: ICD-10-CM

## 2022-11-11 DIAGNOSIS — J30.81 ALLERGIC RHINITIS DUE TO ANIMALS: ICD-10-CM

## 2022-11-11 PROCEDURE — 95117 IMMUNOTHERAPY INJECTIONS: CPT

## 2022-11-11 NOTE — PROGRESS NOTES
Jorge A Garcia presents to clinic today at the request of Arabella Paige MD (ordering provider) for Allergy Immunotherapy injection(s).       This service provided today was under the care of Arabella Paige MD; the supervising provider of the day; who was available if needed.    Selene Harden RN

## 2022-12-13 ENCOUNTER — ALLIED HEALTH/NURSE VISIT (OUTPATIENT)
Dept: ALLERGY | Facility: CLINIC | Age: 32
End: 2022-12-13
Payer: COMMERCIAL

## 2022-12-13 DIAGNOSIS — J30.81 ALLERGY TO DOG DANDER: ICD-10-CM

## 2022-12-13 DIAGNOSIS — J30.1 SEASONAL ALLERGIC RHINITIS DUE TO POLLEN: ICD-10-CM

## 2022-12-13 DIAGNOSIS — J30.81 ALLERGIC RHINITIS DUE TO CATS: Primary | ICD-10-CM

## 2022-12-13 PROCEDURE — 95117 IMMUNOTHERAPY INJECTIONS: CPT

## 2023-01-24 ENCOUNTER — ALLIED HEALTH/NURSE VISIT (OUTPATIENT)
Dept: ALLERGY | Facility: CLINIC | Age: 33
End: 2023-01-24
Payer: COMMERCIAL

## 2023-01-24 DIAGNOSIS — J30.1 SEASONAL ALLERGIC RHINITIS DUE TO POLLEN: ICD-10-CM

## 2023-01-24 DIAGNOSIS — J30.81 ALLERGIC RHINITIS DUE TO CATS: Primary | ICD-10-CM

## 2023-01-24 DIAGNOSIS — J30.81 ALLERGY TO DOG DANDER: ICD-10-CM

## 2023-01-24 PROCEDURE — 95117 IMMUNOTHERAPY INJECTIONS: CPT

## 2023-01-24 NOTE — PROGRESS NOTES
Jorge A Garcia presents to clinic today at the request of Arabella Paige MD (ordering provider) for Allergy Immunotherapy injection(s).       This service provided today was under the care of Arabella Paige MD; the supervising provider of the day; who was available if needed.      Patient presented after waiting 30 minutes with no reaction to  injections. Discharged from clinic.    Selene Harden RN

## 2023-01-30 DIAGNOSIS — J30.81 ALLERGIC RHINITIS DUE TO CATS: Primary | ICD-10-CM

## 2023-01-30 DIAGNOSIS — J30.1 SEASONAL ALLERGIC RHINITIS DUE TO POLLEN: ICD-10-CM

## 2023-01-30 DIAGNOSIS — J30.81 ALLERGY TO DOG DANDER: ICD-10-CM

## 2023-01-30 PROCEDURE — 95165 ANTIGEN THERAPY SERVICES: CPT | Performed by: ALLERGY & IMMUNOLOGY

## 2023-01-30 NOTE — PROGRESS NOTES
AP DOG/RW  1:1 V/V EXP 1/24/2024  GRASS/WEED/TREES/CAT  1:1 V/V EXP 1/24/2024    CHECKED BY PB  CHARGED 20 UNITS

## 2023-02-07 ENCOUNTER — ALLIED HEALTH/NURSE VISIT (OUTPATIENT)
Dept: ALLERGY | Facility: CLINIC | Age: 33
End: 2023-02-07
Payer: COMMERCIAL

## 2023-02-07 DIAGNOSIS — J30.1 SEASONAL ALLERGIC RHINITIS DUE TO POLLEN: ICD-10-CM

## 2023-02-07 DIAGNOSIS — J30.81 ALLERGY TO DOG DANDER: ICD-10-CM

## 2023-02-07 DIAGNOSIS — J30.81 ALLERGIC RHINITIS DUE TO CATS: Primary | ICD-10-CM

## 2023-02-07 PROCEDURE — 95117 IMMUNOTHERAPY INJECTIONS: CPT

## 2023-02-07 NOTE — PROGRESS NOTES
Jorge A Garcia presents to clinic today at the request of Arabella Paige MD (ordering provider) for Allergy Immunotherapy injection(s).       This service provided today was under the care of Arabella Paige MD; the supervising provider of the day; who was available if needed.      Patient presented after waiting 30 minutes with no reaction to  injections. Discharged from clinic.    Violeta Patel RN

## 2023-02-14 ENCOUNTER — ALLIED HEALTH/NURSE VISIT (OUTPATIENT)
Dept: ALLERGY | Facility: CLINIC | Age: 33
End: 2023-02-14
Payer: COMMERCIAL

## 2023-02-14 DIAGNOSIS — J30.81 ALLERGIC RHINITIS DUE TO CATS: Primary | ICD-10-CM

## 2023-02-14 DIAGNOSIS — J30.1 SEASONAL ALLERGIC RHINITIS DUE TO POLLEN: ICD-10-CM

## 2023-02-14 DIAGNOSIS — J30.81 ALLERGY TO DOG DANDER: ICD-10-CM

## 2023-02-14 PROCEDURE — 95117 IMMUNOTHERAPY INJECTIONS: CPT

## 2023-02-27 ENCOUNTER — ALLIED HEALTH/NURSE VISIT (OUTPATIENT)
Dept: ALLERGY | Facility: CLINIC | Age: 33
End: 2023-02-27
Payer: COMMERCIAL

## 2023-02-27 DIAGNOSIS — J30.81 ALLERGIC RHINITIS DUE TO CATS: Primary | ICD-10-CM

## 2023-02-27 PROCEDURE — 95117 IMMUNOTHERAPY INJECTIONS: CPT

## 2023-03-27 ENCOUNTER — ALLIED HEALTH/NURSE VISIT (OUTPATIENT)
Dept: ALLERGY | Facility: CLINIC | Age: 33
End: 2023-03-27
Payer: COMMERCIAL

## 2023-03-27 DIAGNOSIS — J30.81 ALLERGIC RHINITIS DUE TO CATS: Primary | ICD-10-CM

## 2023-03-27 DIAGNOSIS — J30.81 ALLERGY TO DOG DANDER: ICD-10-CM

## 2023-03-27 DIAGNOSIS — J30.1 SEASONAL ALLERGIC RHINITIS DUE TO POLLEN: ICD-10-CM

## 2023-03-27 PROCEDURE — 95117 IMMUNOTHERAPY INJECTIONS: CPT

## 2023-04-03 ENCOUNTER — ALLIED HEALTH/NURSE VISIT (OUTPATIENT)
Dept: ALLERGY | Facility: CLINIC | Age: 33
End: 2023-04-03
Payer: COMMERCIAL

## 2023-04-03 DIAGNOSIS — J30.81 ALLERGIC RHINITIS DUE TO CATS: Primary | ICD-10-CM

## 2023-04-03 PROCEDURE — 95117 IMMUNOTHERAPY INJECTIONS: CPT

## 2023-04-24 ENCOUNTER — ALLIED HEALTH/NURSE VISIT (OUTPATIENT)
Dept: ALLERGY | Facility: CLINIC | Age: 33
End: 2023-04-24
Payer: COMMERCIAL

## 2023-04-24 DIAGNOSIS — J30.81 ALLERGIC RHINITIS DUE TO CATS: Primary | ICD-10-CM

## 2023-04-24 DIAGNOSIS — J30.81 ALLERGY TO DOG DANDER: ICD-10-CM

## 2023-04-24 DIAGNOSIS — J30.1 SEASONAL ALLERGIC RHINITIS DUE TO POLLEN: ICD-10-CM

## 2023-04-24 PROCEDURE — 95117 IMMUNOTHERAPY INJECTIONS: CPT

## 2023-05-22 ENCOUNTER — ALLIED HEALTH/NURSE VISIT (OUTPATIENT)
Dept: ALLERGY | Facility: CLINIC | Age: 33
End: 2023-05-22
Payer: COMMERCIAL

## 2023-05-22 DIAGNOSIS — J30.81 ALLERGIC RHINITIS DUE TO CATS: Primary | ICD-10-CM

## 2023-05-22 PROCEDURE — 95117 IMMUNOTHERAPY INJECTIONS: CPT

## 2023-05-30 ENCOUNTER — OFFICE VISIT (OUTPATIENT)
Dept: FAMILY MEDICINE | Facility: CLINIC | Age: 33
End: 2023-05-30
Payer: COMMERCIAL

## 2023-05-30 VITALS
WEIGHT: 191.19 LBS | BODY MASS INDEX: 27.37 KG/M2 | HEIGHT: 70 IN | OXYGEN SATURATION: 100 % | HEART RATE: 88 BPM | TEMPERATURE: 97.4 F | DIASTOLIC BLOOD PRESSURE: 80 MMHG | SYSTOLIC BLOOD PRESSURE: 120 MMHG

## 2023-05-30 DIAGNOSIS — Z00.00 ANNUAL PHYSICAL EXAM: ICD-10-CM

## 2023-05-30 DIAGNOSIS — Z11.59 ENCOUNTER FOR HEPATITIS C SCREENING TEST FOR LOW RISK PATIENT: ICD-10-CM

## 2023-05-30 DIAGNOSIS — F31.81 BIPOLAR 2 DISORDER (H): ICD-10-CM

## 2023-05-30 DIAGNOSIS — M25.551 HIP PAIN, RIGHT: Primary | ICD-10-CM

## 2023-05-30 DIAGNOSIS — J45.20 MILD INTERMITTENT ASTHMA WITHOUT COMPLICATION: ICD-10-CM

## 2023-05-30 DIAGNOSIS — E03.9 HYPOTHYROIDISM, UNSPECIFIED TYPE: ICD-10-CM

## 2023-05-30 LAB
ALBUMIN SERPL BCG-MCNC: 4.7 G/DL (ref 3.5–5.2)
ALP SERPL-CCNC: 63 U/L (ref 40–129)
ALT SERPL W P-5'-P-CCNC: 17 U/L (ref 10–50)
ANION GAP SERPL CALCULATED.3IONS-SCNC: 12 MMOL/L (ref 7–15)
AST SERPL W P-5'-P-CCNC: 21 U/L (ref 10–50)
BILIRUB SERPL-MCNC: 0.4 MG/DL
BUN SERPL-MCNC: 10.4 MG/DL (ref 6–20)
CALCIUM SERPL-MCNC: 9.4 MG/DL (ref 8.6–10)
CHLORIDE SERPL-SCNC: 102 MMOL/L (ref 98–107)
CHOLEST SERPL-MCNC: 168 MG/DL
CREAT SERPL-MCNC: 0.99 MG/DL (ref 0.67–1.17)
DEPRECATED HCO3 PLAS-SCNC: 26 MMOL/L (ref 22–29)
ERYTHROCYTE [DISTWIDTH] IN BLOOD BY AUTOMATED COUNT: 12.3 % (ref 10–15)
GFR SERPL CREATININE-BSD FRML MDRD: >90 ML/MIN/1.73M2
GLUCOSE SERPL-MCNC: 94 MG/DL (ref 70–99)
HCT VFR BLD AUTO: 44.2 % (ref 40–53)
HCV AB SERPL QL IA: NONREACTIVE
HDLC SERPL-MCNC: 32 MG/DL
HGB BLD-MCNC: 15.2 G/DL (ref 13.3–17.7)
LDLC SERPL CALC-MCNC: 95 MG/DL
MCH RBC QN AUTO: 29.5 PG (ref 26.5–33)
MCHC RBC AUTO-ENTMCNC: 34.4 G/DL (ref 31.5–36.5)
MCV RBC AUTO: 86 FL (ref 78–100)
NONHDLC SERPL-MCNC: 136 MG/DL
PLATELET # BLD AUTO: 222 10E3/UL (ref 150–450)
POTASSIUM SERPL-SCNC: 4.7 MMOL/L (ref 3.4–5.3)
PROT SERPL-MCNC: 7.1 G/DL (ref 6.4–8.3)
RBC # BLD AUTO: 5.15 10E6/UL (ref 4.4–5.9)
SODIUM SERPL-SCNC: 140 MMOL/L (ref 136–145)
TRIGL SERPL-MCNC: 207 MG/DL
TSH SERPL DL<=0.005 MIU/L-ACNC: 3.89 UIU/ML (ref 0.3–4.2)
WBC # BLD AUTO: 5.8 10E3/UL (ref 4–11)

## 2023-05-30 PROCEDURE — 86803 HEPATITIS C AB TEST: CPT | Performed by: STUDENT IN AN ORGANIZED HEALTH CARE EDUCATION/TRAINING PROGRAM

## 2023-05-30 PROCEDURE — 80061 LIPID PANEL: CPT | Performed by: STUDENT IN AN ORGANIZED HEALTH CARE EDUCATION/TRAINING PROGRAM

## 2023-05-30 PROCEDURE — 84443 ASSAY THYROID STIM HORMONE: CPT | Performed by: STUDENT IN AN ORGANIZED HEALTH CARE EDUCATION/TRAINING PROGRAM

## 2023-05-30 PROCEDURE — 80053 COMPREHEN METABOLIC PANEL: CPT | Performed by: STUDENT IN AN ORGANIZED HEALTH CARE EDUCATION/TRAINING PROGRAM

## 2023-05-30 PROCEDURE — 99213 OFFICE O/P EST LOW 20 MIN: CPT | Mod: 25 | Performed by: STUDENT IN AN ORGANIZED HEALTH CARE EDUCATION/TRAINING PROGRAM

## 2023-05-30 PROCEDURE — 36415 COLL VENOUS BLD VENIPUNCTURE: CPT | Performed by: STUDENT IN AN ORGANIZED HEALTH CARE EDUCATION/TRAINING PROGRAM

## 2023-05-30 PROCEDURE — 85027 COMPLETE CBC AUTOMATED: CPT | Performed by: STUDENT IN AN ORGANIZED HEALTH CARE EDUCATION/TRAINING PROGRAM

## 2023-05-30 PROCEDURE — 91313 COVID-19 BIVALENT 18+ (MODERNA): CPT | Performed by: STUDENT IN AN ORGANIZED HEALTH CARE EDUCATION/TRAINING PROGRAM

## 2023-05-30 PROCEDURE — 99395 PREV VISIT EST AGE 18-39: CPT | Mod: 25 | Performed by: STUDENT IN AN ORGANIZED HEALTH CARE EDUCATION/TRAINING PROGRAM

## 2023-05-30 PROCEDURE — 0134A COVID-19 BIVALENT 18+ (MODERNA): CPT | Performed by: STUDENT IN AN ORGANIZED HEALTH CARE EDUCATION/TRAINING PROGRAM

## 2023-05-30 RX ORDER — DIVALPROEX SODIUM 250 MG/1
250 TABLET, EXTENDED RELEASE ORAL AT BEDTIME
COMMUNITY
Start: 2023-02-02 | End: 2023-05-30

## 2023-05-30 ASSESSMENT — ASTHMA QUESTIONNAIRES
QUESTION_1 LAST FOUR WEEKS HOW MUCH OF THE TIME DID YOUR ASTHMA KEEP YOU FROM GETTING AS MUCH DONE AT WORK, SCHOOL OR AT HOME: A LITTLE OF THE TIME
EMERGENCY_ROOM_LAST_YEAR_TOTAL: ONE
ACT_TOTALSCORE: 19
QUESTION_4 LAST FOUR WEEKS HOW OFTEN HAVE YOU USED YOUR RESCUE INHALER OR NEBULIZER MEDICATION (SUCH AS ALBUTEROL): ONE OR TWO TIMES PER DAY
QUESTION_2 LAST FOUR WEEKS HOW OFTEN HAVE YOU HAD SHORTNESS OF BREATH: ONCE OR TWICE A WEEK
QUESTION_3 LAST FOUR WEEKS HOW OFTEN DID YOUR ASTHMA SYMPTOMS (WHEEZING, COUGHING, SHORTNESS OF BREATH, CHEST TIGHTNESS OR PAIN) WAKE YOU UP AT NIGHT OR EARLIER THAN USUAL IN THE MORNING: NOT AT ALL
ACT_TOTALSCORE: 19
QUESTION_5 LAST FOUR WEEKS HOW WOULD YOU RATE YOUR ASTHMA CONTROL: WELL CONTROLLED

## 2023-05-30 ASSESSMENT — ENCOUNTER SYMPTOMS
PARESTHESIAS: 0
FEVER: 0
DYSURIA: 0
WEAKNESS: 0
COUGH: 0
SHORTNESS OF BREATH: 0
ARTHRALGIAS: 1
FREQUENCY: 0
EYE PAIN: 0
HEMATOCHEZIA: 0
DIARRHEA: 0
CONSTIPATION: 0
HEADACHES: 0
ABDOMINAL PAIN: 0
HEMATURIA: 0
NERVOUS/ANXIOUS: 1
CHILLS: 0
MYALGIAS: 0
SORE THROAT: 0
HEARTBURN: 0
JOINT SWELLING: 1
DIZZINESS: 0
PALPITATIONS: 0
NAUSEA: 0

## 2023-05-30 ASSESSMENT — PATIENT HEALTH QUESTIONNAIRE - PHQ9
10. IF YOU CHECKED OFF ANY PROBLEMS, HOW DIFFICULT HAVE THESE PROBLEMS MADE IT FOR YOU TO DO YOUR WORK, TAKE CARE OF THINGS AT HOME, OR GET ALONG WITH OTHER PEOPLE: VERY DIFFICULT
SUM OF ALL RESPONSES TO PHQ QUESTIONS 1-9: 22
SUM OF ALL RESPONSES TO PHQ QUESTIONS 1-9: 22

## 2023-05-30 ASSESSMENT — PAIN SCALES - GENERAL: PAINLEVEL: NO PAIN (0)

## 2023-05-30 NOTE — PROGRESS NOTES
"Assessment/ Plan   33-year-old male with past with history of ADHD, bipolar 2, depression who presents for annual physical exam    1. Bipolar 2 disorder (H)  Associated severe depression. Diagnosed in college. 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 He was on lamotrigine but had hives. Also lithium caused worsening mood symptoms. Now on depakote which he thinks may be helping. Very depressed still right now.  His thyroid was checked and \"boderline low\". ON synthroid like replacement.     2. Mild intermittent asthma without complication  Albuterol as needed        6/21/2022     8:00 AM 8/19/2022    12:16 PM 5/30/2023     8:10 AM   ACT Total Scores   ACT TOTAL SCORE (Goal Greater than or Equal to 20) 22 19 19   In the past 12 months, how many times did you visit the emergency room for your asthma without being admitted to the hospital? 0 0 1   In the past 12 months, how many times were you hospitalized overnight because of your asthma? 0 0 0       3. Hip pain, right  Patient having right hip and knee pain.  Generalized with activity.  His exam is most consistent with some sort of muscular pain though I cannot identify which muscles specifically are involved.  I do not think there is a joint issue based off his exam.  I recommended physical therapy as he was wanting as I think this would likely greatly benefit him.  I also recommended a week course of ibuprofen as well as some heat at night.  - Physical Therapy Referral; Future    4. Encounter for hepatitis C screening test for low risk patient  - Hepatitis C antibody; Future    5. Hypothyroidism, unspecified type  - TSH with free T4 reflex; Future    6. Annual physical exam  - Comprehensive metabolic panel (BMP + Alb, Alk Phos, ALT, AST, Total. Bili, TP); Future  - CBC with platelets; Future  - Lipid panel reflex to direct LDL Non-fasting; Future    Follow-up in: 1 year for physical    Mckinley Ellison MD    Subjective:     Jorge A Garcia is a 33 year old male who presents " for an annual exam.     Chief Complaint   Patient presents with     Physical     Leg Pain     Patient tells me that he had a previous right ankle surgery for a microfracture with hip and knee pain associated with this. Seems to have symptoms returning over the last 5 or 6 years but the last year especially. He has back and buttock pain that is an achiness. His knee pain is a weakness and a collapsing feeling of th knee on the front. He did physical therapy before which improved his symptoms. He is interested in this. He takes ibuprofen which helps.     Answers for HPI/ROS submitted by the patient on 5/30/2023  If you checked off any problems, how difficult have these problems made it for you to do your work, take care of things at home, or get along with other people?: Very difficult  PHQ9 TOTAL SCORE: 22  Frequency of exercise:: 2-3 days/week  Getting at least 3 servings of Calcium per day:: Yes  Diet:: Regular (no restrictions)  Taking medications regularly:: Yes  Medication side effects:: Not applicable  Bi-annual eye exam:: NO  Dental care twice a year:: Yes  Sleep apnea or symptoms of sleep apnea:: Daytime drowsiness  abdominal pain: No  Blood in stool: No  Blood in urine: No  chest pain: No  chills: No  congestion: No  constipation: No  cough: No  diarrhea: No  dizziness: No  ear pain: No  eye pain: No  nervous/anxious: Yes  fever: No  frequency: No  genital sores: No  headaches: No  hearing loss: No  heartburn: No  arthralgias: Yes  joint swelling: Yes  peripheral edema: No  mood changes: Yes  myalgias: No  nausea: No  dysuria: No  palpitations: No  Skin sensation changes: No  sore throat: No  urgency: No  rash: No  shortness of breath: No  visual disturbance: No  weakness: No  impotence: No  penile discharge: No  Additional concerns today:: Yes  Duration of exercise:: 15-30 minutes    Immunization History   Administered Date(s) Administered     COVID-19 Monovalent 18+ (Moderna) 01/21/2021, 02/18/2021,  12/03/2021     Flu, Unspecified 01/11/2013     Influenza Vaccine >6 months (Alfuria,Fluzone) 01/10/2014, 11/01/2017, 10/15/2018, 12/03/2021     Meningococcal ACWY (Menactra ) 06/17/2009     TD,PF 7+ (Tenivac) 02/01/2019     TDAP (Adacel,Boostrix) 06/17/2009     Immunization status: due today.     Current Outpatient Medications   Medication Sig Dispense Refill     albuterol (PROAIR HFA/PROVENTIL HFA/VENTOLIN HFA) 108 (90 Base) MCG/ACT inhaler Inhale 2 puffs into the lungs every 6 hours as needed for shortness of breath / dyspnea or wheezing 16 g 1     amphetamine-dextroamphetamine (ADDERALL) 20 MG tablet Take by mouth 2 times daily       cetirizine (ZYRTEC) 10 MG tablet Take 10 mg by mouth daily        EPINEPHrine (ANY BX GENERIC EQUIV) 0.3 MG/0.3ML injection 2-pack Inject into outer thigh for allergic reaction 2 each 1     liothyronine (CYTOMEL) 5 MCG tablet Take 5 mcg by mouth daily       mometasone (NASONEX) 50 MCG/ACT nasal spray Spray 2 sprays into both nostrils daily        ORDER FOR ALLERGEN IMMUNOTHERAPY Vaccine A MOLD/ INDOORALLERGENS/ RAGWEED  DOG AP Dog hair & dander, mixed breeds 1:10 w/v, 0.5 ml  RW Ragweed, Short ambrosia artemisifolia 1:20 w/v, 1.0 ml  Vaccine B POLLENS/ CAT  G GRASS MIX Kentucky Blue, Orchard, Redtop, Boby 100, 000 BAU 0.3 ml  BIR Birch Mix Paper, River/Red White Birch1:20 w/v, 0.5 ml  PLN Plantain, English Plantago lancolata 1:20 w/v, 0.5 ml  MUG Sagebrush, Mugwort, Artemisia Vulgaris H douglasiana 1:20 w/v, 0.5 ml  CAT Cat Hair 10,000 BAU 2.0 ml  Dilutions: None (red) Frequency: weekly, monthly  1/10 (yellow) Frequency: weekly  1/100 (blue) Frequency: weekly  1/1000 (green) Frequency: weekly 5 mL 1     valproic acid (DEPAKENE) 250 MG capsule Take 250 mg by mouth daily       divalproex sodium extended-release (DEPAKOTE ER) 250 MG 24 hr tablet Take 250 mg by mouth At Bedtime       No past medical history on file.  Past Surgical History:   Procedure Laterality Date     OTHER  "SURGICAL HISTORY  [facility]     WISDOM TOOTH EXTRACTION       Cephalosporins, Amoxicillin, and Pcn [penicillins]  Family History   Problem Relation Age of Onset     Coronary Artery Disease Father         has stents     Coronary Artery Disease Paternal Grandfather         bypass at <50     Social History     Socioeconomic History     Marital status: Single     Spouse name: Not on file     Number of children: Not on file     Years of education: Not on file     Highest education level: Not on file   Occupational History     Not on file   Tobacco Use     Smoking status: Never     Smokeless tobacco: Never   Vaping Use     Vaping status: Not on file   Substance and Sexual Activity     Alcohol use: Yes     Drug use: No     Sexual activity: Yes     Partners: Female     Comment: monogomous with girlfriend   Other Topics Concern     Not on file   Social History Narrative     Not on file     Social Determinants of Health     Financial Resource Strain: Not on file   Food Insecurity: Not on file   Transportation Needs: Not on file   Physical Activity: Not on file   Stress: Not on file   Social Connections: Not on file   Intimate Partner Violence: Not on file   Housing Stability: Not on file         Review of Systems  Complete ROS negative except as noted in the HPI    Objective:      Vitals:    05/30/23 0807   BP: 120/80   Pulse: 88   Temp: 97.4  F (36.3  C)   SpO2: 100%   Weight: 86.7 kg (191 lb 3 oz)   Height: 1.778 m (5' 10\")       Physical Exam:  /80   Pulse 88   Temp 97.4  F (36.3  C)   Ht 1.778 m (5' 10\")   Wt 86.7 kg (191 lb 3 oz)   SpO2 100%   BMI 27.43 kg/m      General appearance: Alert, cooperative, no distress, appears stated age  Head: Normocephalic, atraumatic, without obvious abnormality  EARS: TM's gray dull with structures seen bilaterally  Eyes: Pupils equal round, reactive.  Conjunctiva clear.  Nose: Nares normal, no drainage.  Throat: Lips, mucosa, tongue " normal mucosa pink and moist  Neck: Supple, symmetric, trachea midline, no adenopathy.  No thyroid enlargement, tenderness or nodules.    Lungs: Clear to auscultation bilaterally, no wheezing or crackles present.  Respirations unlabored  Heart: Regular rate and rhythm, normal S1 and S2, no murmur, rub or gallop.  Abdomen: Soft, nontender, nondistended.  Bowel sounds active in all 4 quadrants.  No masses or organomegaly.  Extremities: Extremities normal, atraumatic.  No cyanosis or edema.  Skin: Skin color, texture, turgor normal no rashes or lesions on limited skin exam  Neurologic: CN II through XII intact, normal strength.    Right Hip:  Inspection: Swelling - none; Bruising - none  ROM: Flexion -normal; Extension - normal; ER - normal; IR -normal; Abduction -normal; Adduction normal  Strength: Full in all planes; Pain with resisted abduction  Tenderness: Trochanter - none ASIS none; Inguinal Ligament - none; Pubic Symphysis - none; Ischial Tuberosity-none; Hamstring - none; SI Joint - none.  Maneuvers: OSVALDO - normal; FADIR - normal;    Right Knee:  ROM: 0-130; Crepitus: none  Effusion: none ; Swelling: none  Strength: Full in flexion/ extension  Tenderness: Patella - none Medial joint line - none; Lateral joint line - none; Quad tendon - none; Patellar tendon- none; Hamstring - none.  Cruciates: anterior drawer - neg/posterior drawer -neg. Lachman - neg  Collaterals: varus -neg/valgus -neg.  Patella: patellar compression - neg; single leg bend- neg  Meniscus: Timothy - neg;      No results found for any visits on 05/30/23.    Mckinley Alcazar MD

## 2023-05-31 NOTE — RESULT ENCOUNTER NOTE
Zia  Your results from your recent clinic visit show:  Your lipids look ok and I used these as well as other factors from your history to calculate your 10 year risk of having something like a heart attack (ASCVD risk) and it was low risk. Just continue to work on exercise and diet to maintain this low risk.  Your CMP was normal with normal electrolytes, kidney function, and liver function  Your thyroid is normal (TSH).  Your Hep c screen is normal or negative  Your CBC is normal with no anemia or signs of infection seen    If you have more questions please call the clinic at [phone] or send me a [facility] message    Dr. Mckinley Ellison

## 2023-06-12 ENCOUNTER — ALLIED HEALTH/NURSE VISIT (OUTPATIENT)
Dept: ALLERGY | Facility: CLINIC | Age: 33
End: 2023-06-12
Payer: COMMERCIAL

## 2023-06-12 DIAGNOSIS — J30.1 SEASONAL ALLERGIC RHINITIS DUE TO POLLEN: Primary | ICD-10-CM

## 2023-06-12 PROCEDURE — 95117 IMMUNOTHERAPY INJECTIONS: CPT

## 2023-06-12 NOTE — PROGRESS NOTES
Jorge A Garcia presents to clinic today at the request of Arabella Paige MD (ordering provider) for Allergy Immunotherapy injection(s).       This service provided today was under the care of Arabella Paige MD; the supervising provider of the day; who was available if needed.      Patient presented after waiting 30 minutes with no reaction to  injections. Discharged from clinic.    Milana Tang RN

## 2023-06-16 ENCOUNTER — THERAPY VISIT (OUTPATIENT)
Dept: PHYSICAL THERAPY | Facility: REHABILITATION | Age: 33
End: 2023-06-16
Attending: STUDENT IN AN ORGANIZED HEALTH CARE EDUCATION/TRAINING PROGRAM
Payer: COMMERCIAL

## 2023-06-16 DIAGNOSIS — M25.551 HIP PAIN, RIGHT: ICD-10-CM

## 2023-06-16 PROCEDURE — 97162 PT EVAL MOD COMPLEX 30 MIN: CPT | Mod: GP | Performed by: PHYSICAL THERAPIST

## 2023-06-16 PROCEDURE — 97112 NEUROMUSCULAR REEDUCATION: CPT | Mod: GP | Performed by: PHYSICAL THERAPIST

## 2023-06-16 NOTE — PROGRESS NOTES
"PHYSICAL THERAPY EVALUATION  Type of Visit: Evaluation    See electronic medical record for Abuse and Falls Screening details.    Subjective      Presenting condition or subjective complaint: Jorge A used to run a lot and thinks that his R knee, ankle and hip pain originated from there. He first noticed the pain around 2016. mostly knee pain. At that time the pain was in R knee and pints to R patellar ligament. He kind of stopped run due to this and then when he started to feel pain in his ankle around 2017/2018, and he points to posterior aspect of medial malleolus where the medial tendons course through. He had surgery for this, an arthroscopic microfacture with debridement of the cartilage, which was badly worn, he says. He says rakesh the surgery reduced his ankle pain, \"it helped.\" He no longer has the same pain the ankle, it's not as sharp as it used to be. But the R hip pain persisted, and some imaging of that was done around 2019. That MRI showed torn labrum, a \"very slight tear\" and he does not recall the location of the tear. Tehre was also an x-ray done at that time and that showed, \"maybe some arthritis in the hip.\" He was recommended to do PT, [facility] doing VERN in 2019. 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 He had taken up swimming, which he still does and \"improves my overall pain.\" Over the past couple months, his R ankle began to trouble him again, he thinks due to standing at work. 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 The medial ankle, around the malleolus feels \"sensitive,\" he says, \"it feels like something is being stretched\" behind the mallelus. He is also " "feeling \"weakness\" in his R LE, in the calf and the R hamstring, he says. In his R hip, he feels a dull pain deep and low in the posterior pelvis and points to the region of the deep external rotators.  Date of onset: 16    Relevant medical history:     Dates & types of surgery:      Prior diagnostic imaging/testing results:       Prior therapy history for the same diagnosis, illness or injury:        Prior Level of Function   Transfers:   Ambulation:   ADL:   IADL:     Living Environment  Social support:     Type of home:     Stairs to enter the home:         Ramp:     Stairs inside the home:         Help at home:    Equipment owned:       Employment:      Hobbies/Interests:      Patient goals for therapy:      Pain assessment: Location: R hip /Ratin/10     Objective   FOOT/ANKLE EVALUATION  PAIN: Pain Level with Use: 5/10  INTEGUMENTARY (edema, incisions):   POSTURE:   GAIT:   Weightbearing Status:   Assistive Device(s):   Gait Deviations:   BALANCE/PROPRIOCEPTION:   WEIGHT BEARING ALIGNMENT:   NON-WEIGHTBEARING ALIGNMENT:    ROM: R ankle passive DF/PF ROM WNL but crepitus evident   STRENGTH:   FLEXIBILITY:   SPECIAL TESTS:   FUNCTIONAL TESTS:   PALPATION: TTP medial ankle tendons posterior to medial malleolus; bilateral triceps surae full and tight   JOINT MOBILITY:   KNEE EVALUATION  PAIN: Pain Level with Use: 10  INTEGUMENTARY (edema, incisions):   POSTURE:   GAIT:  Weightbearing Status:   Assistive Device(s):   Gait Deviations:   BALANCE/PROPRIOCEPTION:   WEIGHTBEARING ALIGNMENT:   NON-WEIGHTBEARING ALIGNMENT:   ROM:     STRENGTH:   FLEXIBILITY:   SPECIAL TESTS:   FUNCTIONAL TESTS:   PALPATION:   JOINT MOBILITY:   HIP EVALUATION  PAIN: Pain Level with Use: 4/10  INTEGUMENTARY (edema, incisions):   POSTURE: Standing Posture: Rounded shoulders, Forward head, Lordosis increased, Thoracic kyphosis decreased  GAIT:     Assistive Device(s):   Gait Deviations: Toe in L  BALANCE/PROPRIOCEPTION: " "  WEIGHTBEARING ALIGNMENT:   NON-WEIGHTBEARING ALIGNMENT:    ROM:     PELVIC/SI SCREEN:   STRENGTH:   LE FLEXIBILITY:   SPECIAL TESTS: Standing Reach: 0\"; ADT + BL: EDT + L; Hip IR 30L; 40R  FUNCTIONAL TESTS:   PALPATION:       Assessment & Plan   CLINICAL IMPRESSIONS   Medical Diagnosis: Hip Pain, Right    Treatment Diagnosis: right hip pain, right knee pain, R ankle pain, impaired posture   Impression/Assessment: Patient presents with pain and stiffness R hip, knee and ankle. He exhibits postural prorfile and clinical test results indicating bilateral hypertonus in msucles of the posterior exterioror chain and also the L anterior interior chain. Patient has past experience with the Postural REstoration method of physical therapy, with satifsfactory resolution of symptoms at that; we will endavour to replicate that success with reapplication of the same method, but with an emphasis on a maintenance HEP to conclude our plan of care.     Clinical Decision Making (Complexity):   Clinical Presentation: Stable/Uncomplicated  Clinical Presentation Rationale: based on medical and personal factors listed in PT evaluation  Clinical Decision Making (Complexity): Moderate complexity    PLAN OF CARE  Treatment Interventions:  Interventions: Gait Training, Manual Therapy, Neuromuscular Re-education, Therapeutic Activity    Long Term Goals     PT Goal 1  Goal Identifier: LEFS  Goal Description: Patient will score >60/80 on LEFS  Rationale: to maximize safety and independence with performance of ADLs and functional tasks;to maximize safety and independence within the home;to maximize safety and independence within the community;to maximize safety and independence with self cares;to maximize safety and independence with transportation  Goal Progress: Scored 42/80 on LEFS at al  Target Date: 09/14/23  PT Goal 2  Goal Identifier: Standing tolerance  Goal Description: Patient will report ability to stand and play guitar for two " hours without pain.  Rationale: to maximize safety and independence with performance of ADLs and functional tasks  Target Date: 09/14/23  PT Goal 3  Goal Identifier: Pain  Goal Description: Patient will report wost R hip, knee or ankle pain of >3/10 for an entire 2-week period.  Rationale: to maximize safety and independence with performance of ADLs and functional tasks;to maximize safety and independence within the home;to maximize safety and independence within the community;to maximize safety and independence with transportation;to maximize safety and independence with self cares  Target Date: 09/14/23      Frequency of Treatment: weekly  Duration of Treatment: 90 days    Recommended Referrals to Other Professionals:   Education Assessment:        Risks and benefits of evaluation/treatment have been explained.   Patient/Family/caregiver agrees with Plan of Care.     Evaluation Time:     PT Eval, Moderate Complexity Minutes (02740): 20      Signing Clinician: Eder Sotomayor PT

## 2023-07-11 ENCOUNTER — THERAPY VISIT (OUTPATIENT)
Dept: PHYSICAL THERAPY | Facility: REHABILITATION | Age: 33
End: 2023-07-11
Attending: STUDENT IN AN ORGANIZED HEALTH CARE EDUCATION/TRAINING PROGRAM
Payer: COMMERCIAL

## 2023-07-11 DIAGNOSIS — M25.551 HIP PAIN, RIGHT: Primary | ICD-10-CM

## 2023-07-11 PROCEDURE — 97110 THERAPEUTIC EXERCISES: CPT | Mod: GP | Performed by: PHYSICAL THERAPIST

## 2023-07-11 PROCEDURE — 97140 MANUAL THERAPY 1/> REGIONS: CPT | Mod: GP | Performed by: PHYSICAL THERAPIST

## 2023-07-14 ENCOUNTER — OFFICE VISIT (OUTPATIENT)
Dept: ALLERGY | Facility: CLINIC | Age: 33
End: 2023-07-14
Payer: COMMERCIAL

## 2023-07-14 VITALS — RESPIRATION RATE: 20 BRPM | OXYGEN SATURATION: 100 % | HEART RATE: 104 BPM

## 2023-07-14 DIAGNOSIS — J30.81 ALLERGIC RHINITIS DUE TO CATS: ICD-10-CM

## 2023-07-14 DIAGNOSIS — J45.20 MILD INTERMITTENT ASTHMA WITHOUT COMPLICATION: ICD-10-CM

## 2023-07-14 DIAGNOSIS — J30.1 SEASONAL ALLERGIC RHINITIS DUE TO POLLEN: Primary | ICD-10-CM

## 2023-07-14 PROCEDURE — 95117 IMMUNOTHERAPY INJECTIONS: CPT | Performed by: ALLERGY & IMMUNOLOGY

## 2023-07-14 PROCEDURE — 99213 OFFICE O/P EST LOW 20 MIN: CPT | Mod: 25 | Performed by: ALLERGY & IMMUNOLOGY

## 2023-07-14 ASSESSMENT — ASTHMA QUESTIONNAIRES
QUESTION_4 LAST FOUR WEEKS HOW OFTEN HAVE YOU USED YOUR RESCUE INHALER OR NEBULIZER MEDICATION (SUCH AS ALBUTEROL): ONE OR TWO TIMES PER DAY
QUESTION_1 LAST FOUR WEEKS HOW MUCH OF THE TIME DID YOUR ASTHMA KEEP YOU FROM GETTING AS MUCH DONE AT WORK, SCHOOL OR AT HOME: NONE OF THE TIME
QUESTION_3 LAST FOUR WEEKS HOW OFTEN DID YOUR ASTHMA SYMPTOMS (WHEEZING, COUGHING, SHORTNESS OF BREATH, CHEST TIGHTNESS OR PAIN) WAKE YOU UP AT NIGHT OR EARLIER THAN USUAL IN THE MORNING: NOT AT ALL
QUESTION_5 LAST FOUR WEEKS HOW WOULD YOU RATE YOUR ASTHMA CONTROL: COMPLETELY CONTROLLED
ACUTE_EXACERBATION_TODAY: NO
ACT_TOTALSCORE: 21
ACT_TOTALSCORE: 21
QUESTION_2 LAST FOUR WEEKS HOW OFTEN HAVE YOU HAD SHORTNESS OF BREATH: ONCE OR TWICE A WEEK

## 2023-07-14 NOTE — LETTER
7/14/2023         RE: Jorge A Garcia  [address] [address]        Dear Colleague,    Thank you for referring your patient, Jorge A Garcia, to the [facility]. Please see a copy of my visit note below.          Subjective   Jorge A is a 33 year old, presenting for the following health issues:  Allergy Injection and Allergy Recheck    HPI     Chief complaint: Follow-up allergies    History of present illness: This is a pleasant 33-year-old gentleman here today for follow-up of allergies.  Began allergy shots in August 2021 and reached maintenance in March 2022.  No systemic reactions.  He takes Zyrtec very rarely.  Otherwise his allergy symptoms have markedly improved.  Less nasal congestion and drainage.  He does have an albuterol inhaler to use and uses it mostly prior to exercise.  He states no cough, wheeze or shortness of breath outside of exercise.           Objective    Pulse 104   Resp 20   SpO2 100%   There is no height or weight on file to calculate BMI.  Physical Exam   Gen: Pleasant male not in acute distress  HEENT: Eyes no erythema of the bulbar or palpebral conjunctiva, no edema.  Nose: No congestion, mucosa normal. Mouth: Throat clear, no lip or tongue edema.     Respiratory: Clear to auscultation bilaterally, no adventitious breath sounds    Skin: No rashes or lesions  Psych: Alert and oriented times 3    Impression report and plan:  1.  Allergic rhinitis  2.  Intermittent asthma    Continue allergy shots.  Continue current medication therapy.  Notify of systemic reaction.  Follow in 1 year. Notify if using albuterol greater than 2 times per week outside of exercise               Again, thank you for allowing me to participate in the care of your patient.        Sincerely,        Arabella GRAVES MD

## 2023-07-25 ENCOUNTER — THERAPY VISIT (OUTPATIENT)
Dept: PHYSICAL THERAPY | Facility: REHABILITATION | Age: 33
End: 2023-07-25
Payer: COMMERCIAL

## 2023-07-25 DIAGNOSIS — M25.551 HIP PAIN, RIGHT: Primary | ICD-10-CM

## 2023-07-25 PROCEDURE — 97110 THERAPEUTIC EXERCISES: CPT | Mod: GP | Performed by: PHYSICAL THERAPIST

## 2023-07-25 PROCEDURE — 97140 MANUAL THERAPY 1/> REGIONS: CPT | Mod: GP | Performed by: PHYSICAL THERAPIST

## 2023-08-01 ENCOUNTER — THERAPY VISIT (OUTPATIENT)
Dept: PHYSICAL THERAPY | Facility: REHABILITATION | Age: 33
End: 2023-08-01
Payer: COMMERCIAL

## 2023-08-01 DIAGNOSIS — M25.551 HIP PAIN, RIGHT: Primary | ICD-10-CM

## 2023-08-01 PROCEDURE — 97110 THERAPEUTIC EXERCISES: CPT | Mod: GP | Performed by: PHYSICAL THERAPIST

## 2023-08-01 PROCEDURE — 97140 MANUAL THERAPY 1/> REGIONS: CPT | Mod: GP | Performed by: PHYSICAL THERAPIST

## 2023-08-11 ENCOUNTER — ALLIED HEALTH/NURSE VISIT (OUTPATIENT)
Dept: ALLERGY | Facility: CLINIC | Age: 33
End: 2023-08-11
Payer: COMMERCIAL

## 2023-08-11 DIAGNOSIS — J30.81 ALLERGIC RHINITIS DUE TO CATS: ICD-10-CM

## 2023-08-11 DIAGNOSIS — J30.1 SEASONAL ALLERGIC RHINITIS DUE TO POLLEN: Primary | ICD-10-CM

## 2023-08-11 PROCEDURE — 95117 IMMUNOTHERAPY INJECTIONS: CPT

## 2023-08-16 ENCOUNTER — THERAPY VISIT (OUTPATIENT)
Dept: PHYSICAL THERAPY | Facility: REHABILITATION | Age: 33
End: 2023-08-16
Payer: COMMERCIAL

## 2023-08-16 DIAGNOSIS — M62.81 GENERALIZED MUSCLE WEAKNESS: ICD-10-CM

## 2023-08-16 DIAGNOSIS — R29.3 ABNORMAL POSTURE: Primary | ICD-10-CM

## 2023-08-16 DIAGNOSIS — M25.551 HIP PAIN, RIGHT: ICD-10-CM

## 2023-08-16 PROCEDURE — 97110 THERAPEUTIC EXERCISES: CPT | Mod: GP | Performed by: PHYSICAL THERAPIST

## 2023-08-16 PROCEDURE — 97140 MANUAL THERAPY 1/> REGIONS: CPT | Mod: GP | Performed by: PHYSICAL THERAPIST

## 2023-08-22 DIAGNOSIS — J30.81 ALLERGIC RHINITIS DUE TO CATS: ICD-10-CM

## 2023-08-22 DIAGNOSIS — J30.1 SEASONAL ALLERGIC RHINITIS DUE TO POLLEN: Primary | ICD-10-CM

## 2023-08-22 DIAGNOSIS — J30.81 ALLERGY TO DOG DANDER: ICD-10-CM

## 2023-08-22 PROCEDURE — 95165 ANTIGEN THERAPY SERVICES: CPT | Performed by: ALLERGY & IMMUNOLOGY

## 2023-08-22 NOTE — PROGRESS NOTES
AP DOG/RW  1:1 V/V EXP 8/21/2024  GRASS/WEED/TREE/CAT  1:1 V/V EXP 8/21/2024    CHECKED BY ABS  CHARGED 20 UNITS

## 2023-08-30 ENCOUNTER — THERAPY VISIT (OUTPATIENT)
Dept: PHYSICAL THERAPY | Facility: REHABILITATION | Age: 33
End: 2023-08-30
Payer: COMMERCIAL

## 2023-08-30 DIAGNOSIS — M62.81 GENERALIZED MUSCLE WEAKNESS: ICD-10-CM

## 2023-08-30 DIAGNOSIS — M25.551 HIP PAIN, RIGHT: Primary | ICD-10-CM

## 2023-08-30 DIAGNOSIS — R29.3 ABNORMAL POSTURE: ICD-10-CM

## 2023-08-30 PROCEDURE — 97110 THERAPEUTIC EXERCISES: CPT | Mod: GP | Performed by: PHYSICAL THERAPIST

## 2023-08-30 PROCEDURE — 97140 MANUAL THERAPY 1/> REGIONS: CPT | Mod: GP | Performed by: PHYSICAL THERAPIST

## 2023-09-06 ENCOUNTER — TELEPHONE (OUTPATIENT)
Dept: PHYSICAL THERAPY | Facility: REHABILITATION | Age: 33
End: 2023-09-06

## 2023-09-08 ENCOUNTER — ALLIED HEALTH/NURSE VISIT (OUTPATIENT)
Dept: ALLERGY | Facility: CLINIC | Age: 33
End: 2023-09-08
Payer: COMMERCIAL

## 2023-09-08 DIAGNOSIS — J30.81 ALLERGIC RHINITIS DUE TO CATS: ICD-10-CM

## 2023-09-08 DIAGNOSIS — J30.1 SEASONAL ALLERGIC RHINITIS DUE TO POLLEN: Primary | ICD-10-CM

## 2023-09-08 DIAGNOSIS — J30.81 ALLERGY TO DOG DANDER: ICD-10-CM

## 2023-09-08 PROCEDURE — 95117 IMMUNOTHERAPY INJECTIONS: CPT

## 2023-09-13 ENCOUNTER — THERAPY VISIT (OUTPATIENT)
Dept: PHYSICAL THERAPY | Facility: REHABILITATION | Age: 33
End: 2023-09-13
Payer: COMMERCIAL

## 2023-09-13 DIAGNOSIS — M25.551 HIP PAIN, RIGHT: Primary | ICD-10-CM

## 2023-09-13 DIAGNOSIS — R29.3 ABNORMAL POSTURE: ICD-10-CM

## 2023-09-13 DIAGNOSIS — M62.81 GENERALIZED MUSCLE WEAKNESS: ICD-10-CM

## 2023-09-13 PROCEDURE — 97110 THERAPEUTIC EXERCISES: CPT | Mod: GP | Performed by: PHYSICAL THERAPIST

## 2023-09-13 PROCEDURE — 97140 MANUAL THERAPY 1/> REGIONS: CPT | Mod: GP | Performed by: PHYSICAL THERAPIST

## 2023-09-20 ENCOUNTER — THERAPY VISIT (OUTPATIENT)
Dept: PHYSICAL THERAPY | Facility: REHABILITATION | Age: 33
End: 2023-09-20
Payer: COMMERCIAL

## 2023-09-20 DIAGNOSIS — M62.81 GENERALIZED MUSCLE WEAKNESS: ICD-10-CM

## 2023-09-20 DIAGNOSIS — R29.3 ABNORMAL POSTURE: ICD-10-CM

## 2023-09-20 DIAGNOSIS — M25.551 HIP PAIN, RIGHT: Primary | ICD-10-CM

## 2023-09-20 PROCEDURE — 97110 THERAPEUTIC EXERCISES: CPT | Mod: GP | Performed by: PHYSICAL THERAPIST

## 2023-09-20 PROCEDURE — 97140 MANUAL THERAPY 1/> REGIONS: CPT | Mod: GP | Performed by: PHYSICAL THERAPIST

## 2023-09-22 ENCOUNTER — ALLIED HEALTH/NURSE VISIT (OUTPATIENT)
Dept: ALLERGY | Facility: CLINIC | Age: 33
End: 2023-09-22
Payer: COMMERCIAL

## 2023-09-22 DIAGNOSIS — J30.1 SEASONAL ALLERGIC RHINITIS DUE TO POLLEN: Primary | ICD-10-CM

## 2023-09-22 DIAGNOSIS — J30.81 ALLERGY TO DOG DANDER: ICD-10-CM

## 2023-09-22 DIAGNOSIS — J30.81 ALLERGIC RHINITIS DUE TO CATS: ICD-10-CM

## 2023-09-22 PROCEDURE — 95117 IMMUNOTHERAPY INJECTIONS: CPT

## 2023-09-27 ENCOUNTER — THERAPY VISIT (OUTPATIENT)
Dept: PHYSICAL THERAPY | Facility: REHABILITATION | Age: 33
End: 2023-09-27
Payer: COMMERCIAL

## 2023-09-27 DIAGNOSIS — R29.3 ABNORMAL POSTURE: ICD-10-CM

## 2023-09-27 DIAGNOSIS — M62.81 GENERALIZED MUSCLE WEAKNESS: ICD-10-CM

## 2023-09-27 DIAGNOSIS — M25.551 HIP PAIN, RIGHT: Primary | ICD-10-CM

## 2023-09-27 PROCEDURE — 97140 MANUAL THERAPY 1/> REGIONS: CPT | Mod: GP | Performed by: PHYSICAL THERAPIST

## 2023-09-27 PROCEDURE — 97110 THERAPEUTIC EXERCISES: CPT | Mod: GP | Performed by: PHYSICAL THERAPIST

## 2023-10-06 ENCOUNTER — ALLIED HEALTH/NURSE VISIT (OUTPATIENT)
Dept: ALLERGY | Facility: CLINIC | Age: 33
End: 2023-10-06
Payer: COMMERCIAL

## 2023-10-06 DIAGNOSIS — J30.1 SEASONAL ALLERGIC RHINITIS DUE TO POLLEN: Primary | ICD-10-CM

## 2023-10-06 PROCEDURE — 95117 IMMUNOTHERAPY INJECTIONS: CPT

## 2023-10-11 ENCOUNTER — THERAPY VISIT (OUTPATIENT)
Dept: PHYSICAL THERAPY | Facility: REHABILITATION | Age: 33
End: 2023-10-11
Payer: COMMERCIAL

## 2023-10-11 DIAGNOSIS — M25.551 HIP PAIN, RIGHT: Primary | ICD-10-CM

## 2023-10-11 DIAGNOSIS — M62.81 GENERALIZED MUSCLE WEAKNESS: ICD-10-CM

## 2023-10-11 DIAGNOSIS — R29.3 ABNORMAL POSTURE: ICD-10-CM

## 2023-10-11 PROCEDURE — 97140 MANUAL THERAPY 1/> REGIONS: CPT | Mod: GP | Performed by: PHYSICAL THERAPIST

## 2023-10-11 PROCEDURE — 97110 THERAPEUTIC EXERCISES: CPT | Mod: GP | Performed by: PHYSICAL THERAPIST

## 2023-10-16 DIAGNOSIS — J45.990 EXERCISE INDUCED BRONCHOSPASM: ICD-10-CM

## 2023-10-16 RX ORDER — ALBUTEROL SULFATE 90 UG/1
2 AEROSOL, METERED RESPIRATORY (INHALATION) EVERY 6 HOURS PRN
Qty: 18 G | Refills: 3 | Status: SHIPPED | OUTPATIENT
Start: 2023-10-16

## 2023-11-03 ENCOUNTER — ALLIED HEALTH/NURSE VISIT (OUTPATIENT)
Dept: ALLERGY | Facility: CLINIC | Age: 33
End: 2023-11-03
Payer: COMMERCIAL

## 2023-11-03 DIAGNOSIS — J30.81 ALLERGIC RHINITIS DUE TO CATS: ICD-10-CM

## 2023-11-03 DIAGNOSIS — J45.990 EXERCISE INDUCED BRONCHOSPASM: Primary | ICD-10-CM

## 2023-11-03 PROCEDURE — 95117 IMMUNOTHERAPY INJECTIONS: CPT

## 2023-11-03 RX ORDER — DIVALPROEX SODIUM 500 MG/1
500 TABLET, EXTENDED RELEASE ORAL AT BEDTIME
COMMUNITY
Start: 2023-10-12

## 2023-11-03 RX ORDER — ARIPIPRAZOLE 5 MG/1
5 TABLET ORAL DAILY
COMMUNITY
Start: 2023-10-31

## 2023-11-09 NOTE — PROGRESS NOTES
DISCHARGE NOTE:    Reason for Discharge: Patient has met all goals.    Equipment Issued: NA    Discharge Plan: Patient to continue home program.    Referring Provider:  Mckinley Alcazar       10/11/23 0500   Appointment Info   Signing clinician's name / credentials Liz Stevenson, PT   Visits Used 10   Medical Diagnosis Hip Pain, Right   PT Tx Diagnosis right hip pain, right knee pain, R ankle pain, impaired posture   Progress Note/Certification   Onset of illness/injury or Date of Surgery 01/01/16   Therapy Frequency weekly   Predicted Duration 90 days   GOALS   PT Goals 2;3;4   PT Goal 1   Goal Identifier LEFS   Goal Description Patient will score >60/80 on LEFS   Rationale to maximize safety and independence with performance of ADLs and functional tasks;to maximize safety and independence within the home;to maximize safety and independence within the community;to maximize safety and independence with self cares;to maximize safety and independence with transportation   Goal Progress Met - 66/80   Target Date 09/14/23   Date Met 10/11/23   PT Goal 2   Goal Identifier Standing tolerance   Goal Description Patient will report ability to stand and play guitar for two hours without pain.   Rationale to maximize safety and independence with performance of ADLs and functional tasks   Goal Progress Pt is choosing to sit to play guitar to avoid possible sx   Target Date 09/14/23   PT Goal 3   Goal Identifier Pain   Goal Description Patient will report wost R hip, knee or ankle pain of >3/10 for an entire 2-week period.   Rationale to maximize safety and independence with performance of ADLs and functional tasks;to maximize safety and independence within the home;to maximize safety and independence within the community;to maximize safety and independence with transportation;to maximize safety and independence with self cares   Goal Progress Pt reports sx can get up to 3-4/10, but on average his sx are less than this   Target  "Date 09/14/23   Subjective Report   Subjective Report Pt reports that overall he is doing well.  He now has some sporadic sx with some \"sensation\" in his R hip and occasional tingling in his R foot, but it all seems better.  He is able to lay on his R side better.  His exercises seem to be getting easier.  His sx do not impair his ability to perform his daily tasks.   Objective Measures   Objective Measures Objective Measure 1;Objective Measure 2   Objective Measure 1   Objective Measure Lumbar ROM   Details flexion= no loss, no pain; extension=no loss, no pain; R and L SB=no loss, min sx with R SB; R and L Rot = no loss, no pain   Objective Measure 2   Objective Measure LEFS   Details 10/11/23=66/80; eval=42/80   Treatment Interventions (PT)   Interventions Neuromuscular Re-education;Therapeutic Procedure/Exercise;Manual Therapy   Therapeutic Procedure/Exercise   Therapeutic Procedures: strength, endurance, ROM, flexibillity minutes (72172) 32   Therapeutic Procedures Ther Proc 2;Ther Proc 3;Ther Proc 4;Ther Proc 5;Ther Proc 6   Ther Proc 1 Piriformis above 90 stretch + H/S stretch + nerve glide: not today   Ther Proc 1 - Details Seated Piriformis stretch: Rx30\" after MT   Ther Proc 2 Supine LTR:   Ther Proc 2 - Details Standing hip ABD: band side step with red loop at knee   Ther Proc 3 H/S stretch + hip flexor + nerve glides: R, L X30\" + x30\"+ x30\" + 10 - on step   Ther Proc 3 - Details prone:  BONNIE X60\" + BONNIE with H/S Curls x10 + Prone press-ups x10 to height tolerated   Ther Proc 4 Clamshells: R, L x15 with orange loop   Ther Proc 4 - Details Bridges: x15 with orange loop   Ther Proc 5 SLR: flexion:focus on ab set - R, L x15   Ther Proc 5 - Details Treadmill: x5 min for warm-up and subjective discussion   Ther Proc 6 VERN - 90/90 hip shift with R heel lift: x3 reps with 3 breaths   Ther Proc 6 - Details Squats:   Neuromuscular Re-education   Neuro Re-ed 1 Patient education regarding pathomechanics, effect of " asthma on same, prognosis, POC.   Neuro Re-ed 2 Taught and practiced All Four Belly Lift Walk   Neuro Re-ed 3 Taugth and practicd Modfied All Four Belly Lift   Neuro Re-ed 4 Taught and practiced Lat Hang with straw and introcution to balloon   Patient Response/Progress Not done today   Manual Therapy   Manual Therapy: Mobilization, MFR, MLD, friction massage minutes (94195) 8   Manual Therapy 1 Trigger point release   Manual Therapy 1 - Details STM and TPR to the R lumbar, gluteal, and proximal hamstring muscles in prone with pillow at hips   Skilled Intervention Pt in prone for MT to reduce tightness   Patient Response/Progress good with reports of decrease tightness and pain   Plan   Home program VERN exercises and PTRX   Plan for next session Pt ready to trial independenyt sx management and HEP for 30 days and then discharge the pt if he does not need to return.   Comments   Comments Jorge A has shown slow improvement in his hip and LE pain over the course of his PT sessions.  He does still have some occasional vague sx into his R hip and LE.  The pt has overall increased strength in his core, lumbar and hip muscles.   He has continued but reduced tightness in his R posterior and lateral hip and proximal H/S that contribute to his symptoms. He always feels good relief with manual therapy. He did well with final review of his HEP today including progressions. Jorge A is now ready to trial independent sx management and HEP.   Total Session Time   Timed Code Treatment Minutes 40   Total Treatment Time (sum of timed and untimed services) 40     Liz Stevenson, PT

## 2023-12-18 ENCOUNTER — ALLIED HEALTH/NURSE VISIT (OUTPATIENT)
Dept: ALLERGY | Facility: CLINIC | Age: 33
End: 2023-12-18
Payer: COMMERCIAL

## 2023-12-18 DIAGNOSIS — J30.1 SEASONAL ALLERGIC RHINITIS DUE TO POLLEN: ICD-10-CM

## 2023-12-18 DIAGNOSIS — J30.81 ALLERGY TO DOG DANDER: ICD-10-CM

## 2023-12-18 DIAGNOSIS — J30.81 ALLERGIC RHINITIS DUE TO CATS: Primary | ICD-10-CM

## 2023-12-18 PROCEDURE — 95117 IMMUNOTHERAPY INJECTIONS: CPT

## 2024-01-15 ENCOUNTER — ALLIED HEALTH/NURSE VISIT (OUTPATIENT)
Dept: ALLERGY | Facility: CLINIC | Age: 34
End: 2024-01-15
Payer: COMMERCIAL

## 2024-01-15 DIAGNOSIS — J30.81 ALLERGIC RHINITIS DUE TO CATS: Primary | ICD-10-CM

## 2024-01-15 PROCEDURE — 95117 IMMUNOTHERAPY INJECTIONS: CPT

## 2024-01-15 RX ORDER — LURASIDONE HYDROCHLORIDE 20 MG/1
20 TABLET, FILM COATED ORAL EVERY EVENING
COMMUNITY
Start: 2023-12-23

## 2024-01-15 NOTE — PROGRESS NOTES
Jorge A Garcia presents to clinic today at the request of Arabella Paige MD (ordering provider) for Allergy Immunotherapy injection(s).       This service provided today was under the care of Arabella Paige MD; the supervising provider of the day; who was available if needed.      Patient presented after waiting 30 minutes with no reaction to  injections. Discharged from clinic.    Selene Harden RN     Veinpunture to left forearm. Patient tolerated well.

## 2024-01-22 ENCOUNTER — ALLIED HEALTH/NURSE VISIT (OUTPATIENT)
Dept: ALLERGY | Facility: CLINIC | Age: 34
End: 2024-01-22
Payer: COMMERCIAL

## 2024-01-22 DIAGNOSIS — J30.1 SEASONAL ALLERGIC RHINITIS DUE TO POLLEN: ICD-10-CM

## 2024-01-22 DIAGNOSIS — J30.81 ALLERGIC RHINITIS DUE TO CATS: Primary | ICD-10-CM

## 2024-01-22 PROCEDURE — 95117 IMMUNOTHERAPY INJECTIONS: CPT

## 2024-02-12 ENCOUNTER — ALLIED HEALTH/NURSE VISIT (OUTPATIENT)
Dept: ALLERGY | Facility: CLINIC | Age: 34
End: 2024-02-12
Payer: COMMERCIAL

## 2024-02-12 DIAGNOSIS — J30.1 SEASONAL ALLERGIC RHINITIS DUE TO POLLEN: Primary | ICD-10-CM

## 2024-02-12 PROCEDURE — 95117 IMMUNOTHERAPY INJECTIONS: CPT

## 2024-03-29 ENCOUNTER — ALLIED HEALTH/NURSE VISIT (OUTPATIENT)
Dept: ALLERGY | Facility: CLINIC | Age: 34
End: 2024-03-29
Payer: COMMERCIAL

## 2024-03-29 DIAGNOSIS — J30.1 SEASONAL ALLERGIC RHINITIS DUE TO POLLEN: Primary | ICD-10-CM

## 2024-03-29 PROCEDURE — 95117 IMMUNOTHERAPY INJECTIONS: CPT

## 2024-03-29 NOTE — PROGRESS NOTES
Jorge A Garcia presents to clinic today at the request of Arabella Pagie MD (ordering provider) for Allergy Immunotherapy injection(s).       This service provided today was under the care of Arabella Paige MD; the supervising provider of the day; who was available if needed.      Patient presented after waiting 30 minutes with no reaction to  injections. Discharged from clinic.    Milana Tang RN

## 2024-04-08 ENCOUNTER — ALLIED HEALTH/NURSE VISIT (OUTPATIENT)
Dept: ALLERGY | Facility: CLINIC | Age: 34
End: 2024-04-08
Payer: COMMERCIAL

## 2024-04-08 DIAGNOSIS — J30.1 SEASONAL ALLERGIC RHINITIS DUE TO POLLEN: Primary | ICD-10-CM

## 2024-04-08 PROCEDURE — 95117 IMMUNOTHERAPY INJECTIONS: CPT

## 2024-04-30 ENCOUNTER — PATIENT OUTREACH (OUTPATIENT)
Dept: CARE COORDINATION | Facility: CLINIC | Age: 34
End: 2024-04-30
Payer: COMMERCIAL

## 2024-05-02 DIAGNOSIS — J30.81 ALLERGY TO DOG DANDER: ICD-10-CM

## 2024-05-02 DIAGNOSIS — J30.1 SEASONAL ALLERGIC RHINITIS DUE TO POLLEN: Primary | ICD-10-CM

## 2024-05-02 DIAGNOSIS — J30.81 ALLERGIC RHINITIS DUE TO CATS: ICD-10-CM

## 2024-05-02 PROCEDURE — 95165 ANTIGEN THERAPY SERVICES: CPT | Performed by: ALLERGY & IMMUNOLOGY

## 2024-05-02 NOTE — PROGRESS NOTES
[facility] DOG/RW  1:1 V/V BUD:  4/30/2025  GRASS/WEED/TREE/CAT  1:1 V/V BUD: 4/30/2025    CHARGED 20 UNITS  CHECKED BY IB

## 2024-05-06 ENCOUNTER — ALLIED HEALTH/NURSE VISIT (OUTPATIENT)
Dept: ALLERGY | Facility: CLINIC | Age: 34
End: 2024-05-06
Payer: COMMERCIAL

## 2024-05-06 DIAGNOSIS — J30.1 SEASONAL ALLERGIC RHINITIS DUE TO POLLEN: Primary | ICD-10-CM

## 2024-05-06 PROCEDURE — 95117 IMMUNOTHERAPY INJECTIONS: CPT

## 2024-05-14 ENCOUNTER — PATIENT OUTREACH (OUTPATIENT)
Dept: CARE COORDINATION | Facility: CLINIC | Age: 34
End: 2024-05-14
Payer: COMMERCIAL

## 2024-05-20 ENCOUNTER — ALLIED HEALTH/NURSE VISIT (OUTPATIENT)
Dept: ALLERGY | Facility: CLINIC | Age: 34
End: 2024-05-20
Payer: COMMERCIAL

## 2024-05-20 DIAGNOSIS — J30.1 SEASONAL ALLERGIC RHINITIS DUE TO POLLEN: Primary | ICD-10-CM

## 2024-05-20 PROCEDURE — 95117 IMMUNOTHERAPY INJECTIONS: CPT

## 2024-07-01 ENCOUNTER — ALLIED HEALTH/NURSE VISIT (OUTPATIENT)
Dept: ALLERGY | Facility: CLINIC | Age: 34
End: 2024-07-01
Payer: COMMERCIAL

## 2024-07-01 DIAGNOSIS — J30.1 SEASONAL ALLERGIC RHINITIS DUE TO POLLEN: Primary | ICD-10-CM

## 2024-07-01 PROCEDURE — 95117 IMMUNOTHERAPY INJECTIONS: CPT

## 2024-07-06 ENCOUNTER — HEALTH MAINTENANCE LETTER (OUTPATIENT)
Age: 34
End: 2024-07-06

## 2024-07-08 ENCOUNTER — OFFICE VISIT (OUTPATIENT)
Dept: ALLERGY | Facility: CLINIC | Age: 34
End: 2024-07-08
Payer: COMMERCIAL

## 2024-07-08 VITALS — WEIGHT: 202.4 LBS | OXYGEN SATURATION: 97 % | HEART RATE: 116 BPM | BODY MASS INDEX: 29.04 KG/M2

## 2024-07-08 DIAGNOSIS — J30.81 ALLERGIC RHINITIS DUE TO CATS: ICD-10-CM

## 2024-07-08 DIAGNOSIS — J30.81 ALLERGY TO DOG DANDER: ICD-10-CM

## 2024-07-08 DIAGNOSIS — J30.1 SEASONAL ALLERGIC RHINITIS DUE TO POLLEN: Primary | ICD-10-CM

## 2024-07-08 PROCEDURE — 95117 IMMUNOTHERAPY INJECTIONS: CPT | Performed by: ALLERGY & IMMUNOLOGY

## 2024-07-08 NOTE — LETTER
7/8/2024      Jorge A Garcia  [address] [facility] [address]      Dear Colleague,    Thank you for referring your patient, Jorge A Garcia, to the [facility]. Please see a copy of my visit note below.          Subjective  Jorge A is a 34 year old, presenting for the following health issues:  Allergy Consult and Allergy Recheck    HPI     Chief complaint: Follow-up allergies    History of present illness: This is a pleasant 34-year-old gentleman here today for follow-up of allergies.  He has been on allergy shots for almost 3 years and reached maintenance in March 2022.  No recent systemic reactions.  He has an albuterol inhaler and uses it mostly with exercise.  Denies any current cough, wheeze or shortness of breath.  Notes that he might be moving to [facility].  This will happen in the fall.  He wonders how this will work with his allergy shots.          Objective   Pulse 116   Wt 91.8 kg (202 lb 6.4 oz)   SpO2 97%   BMI 29.04 kg/m    Body mass index is 29.04 kg/m .  Physical Exam     Gen: Pleasant male not in acute distress  HEENT: Eyes no erythema of the bulbar or palpebral conjunctiva, no edema. Ears: No deformities or lesions. Nose: No congestion,  Mouth: Throat clear, no lip or tongue edema.   Neck: No masses lesions or swelling  Respiratory: No coughing with breathing, no retractions  Lymph: No visible supraclavicular or cervical lymphadenopathy  Skin: No rashes or lesions  Psych: Alert and appropriate for age    Impression report and plan:  1.  Allergic rhinitis    Continue allergy shots.  Continue current medication therapy.  Notify of systemic reaction.  Follow in 1 year.  If patient discontinues allergy shots and then returns, could consider cluster immunotherapy.    Signed Electronically by: Arabella GRAVES MD      Again, thank you for allowing me to participate in the care of your patient.        Sincerely,        Arabella GRAVES MD

## 2024-07-08 NOTE — PROGRESS NOTES
Subjective   Jorge A is a 34 year old, presenting for the following health issues:  Allergy Consult and Allergy Recheck    HPI     Chief complaint: Follow-up allergies    History of present illness: This is a pleasant 34-year-old gentleman here today for follow-up of allergies.  He has been on allergy shots for almost 3 years and reached maintenance in March 2022.  No recent systemic reactions.  He has an albuterol inhaler and uses it mostly with exercise.  Denies any current cough, wheeze or shortness of breath.  Notes that he might be moving to [place].  This will happen in the fall.  He wonders how this will work with his allergy shots.          Objective    Pulse 116   Wt 91.8 kg (202 lb 6.4 oz)   SpO2 97%   BMI 29.04 kg/m    Body mass index is 29.04 kg/m .  Physical Exam     Gen: Pleasant male not in acute distress  HEENT: Eyes no erythema of the bulbar or palpebral conjunctiva, no edema. Ears: No deformities or lesions. Nose: No congestion,  Mouth: Throat clear, no lip or tongue edema.   Neck: No masses lesions or swelling  Respiratory: No coughing with breathing, no retractions  Lymph: No visible supraclavicular or cervical lymphadenopathy  Skin: No rashes or lesions  Psych: Alert and appropriate for age    Impression report and plan:  1.  Allergic rhinitis    Continue allergy shots.  Continue current medication therapy.  Notify of systemic reaction.  Follow in 1 year.  If patient discontinues allergy shots and then returns, could consider cluster immunotherapy.    Signed Electronically by: Arabella GRAVES MD

## 2024-07-15 ENCOUNTER — ALLIED HEALTH/NURSE VISIT (OUTPATIENT)
Dept: ALLERGY | Facility: CLINIC | Age: 34
End: 2024-07-15
Payer: COMMERCIAL

## 2024-07-15 DIAGNOSIS — J30.1 SEASONAL ALLERGIC RHINITIS DUE TO POLLEN: Primary | ICD-10-CM

## 2024-07-15 PROCEDURE — 95117 IMMUNOTHERAPY INJECTIONS: CPT

## 2024-11-26 ENCOUNTER — PATIENT OUTREACH (OUTPATIENT)
Dept: CARE COORDINATION | Facility: CLINIC | Age: 34
End: 2024-11-26
Payer: COMMERCIAL

## 2025-07-13 ENCOUNTER — HEALTH MAINTENANCE LETTER (OUTPATIENT)
Age: 35
End: 2025-07-13